# Patient Record
Sex: FEMALE | Race: WHITE | NOT HISPANIC OR LATINO | Employment: OTHER | URBAN - METROPOLITAN AREA
[De-identification: names, ages, dates, MRNs, and addresses within clinical notes are randomized per-mention and may not be internally consistent; named-entity substitution may affect disease eponyms.]

---

## 2018-04-03 ENCOUNTER — OFFICE VISIT CONVERTED (OUTPATIENT)
Dept: CARDIOLOGY | Facility: CLINIC | Age: 63
End: 2018-04-03
Attending: SPECIALIST

## 2018-11-01 ENCOUNTER — OFFICE VISIT CONVERTED (OUTPATIENT)
Dept: NEUROSURGERY | Facility: CLINIC | Age: 63
End: 2018-11-01
Attending: NEUROLOGICAL SURGERY

## 2019-02-11 ENCOUNTER — HOSPITAL ENCOUNTER (OUTPATIENT)
Dept: OTHER | Facility: HOSPITAL | Age: 64
Discharge: HOME | End: 2019-02-11
Attending: SPECIALIST

## 2019-02-11 LAB
ALBUMIN SERPL-MCNC: 4.1 G/DL (ref 3.5–5)
ALBUMIN/GLOB SERPL: 1.5 {RATIO} (ref 1.4–2.6)
ALP SERPL-CCNC: 94 U/L (ref 43–160)
ALT SERPL-CCNC: 27 U/L (ref 10–40)
ANION GAP SERPL CALC-SCNC: 18 MMOL/L (ref 8–19)
AST SERPL-CCNC: 17 U/L (ref 15–50)
BILIRUB SERPL-MCNC: 0.74 MG/DL (ref 0.2–1.3)
BUN SERPL-MCNC: 19 MG/DL (ref 5–25)
BUN/CREAT SERPL: 17 {RATIO} (ref 6–20)
CALCIUM SERPL-MCNC: 9.7 MG/DL (ref 8.7–10.4)
CHLORIDE SERPL-SCNC: 93 MMOL/L (ref 99–111)
CHOLEST SERPL-MCNC: 231 MG/DL (ref 107–200)
CHOLEST/HDLC SERPL: 5.9 {RATIO} (ref 3–6)
CONV CO2: 29 MMOL/L (ref 22–32)
CONV TOTAL PROTEIN: 6.9 G/DL (ref 6.3–8.2)
CREAT UR-MCNC: 1.13 MG/DL (ref 0.5–0.9)
GFR SERPLBLD BASED ON 1.73 SQ M-ARVRAT: 51 ML/MIN/{1.73_M2}
GLOBULIN UR ELPH-MCNC: 2.8 G/DL (ref 2–3.5)
GLUCOSE SERPL-MCNC: 370 MG/DL (ref 65–99)
HDLC SERPL-MCNC: 39 MG/DL (ref 40–60)
LDLC SERPL CALC-MCNC: 148 MG/DL (ref 70–100)
OSMOLALITY SERPL CALC.SUM OF ELEC: 299 MOSM/KG (ref 273–304)
POTASSIUM SERPL-SCNC: 4.4 MMOL/L (ref 3.5–5.3)
SODIUM SERPL-SCNC: 136 MMOL/L (ref 135–147)
TRIGL SERPL-MCNC: 463 MG/DL (ref 40–150)

## 2019-03-08 ENCOUNTER — OFFICE VISIT CONVERTED (OUTPATIENT)
Dept: CARDIOLOGY | Facility: CLINIC | Age: 64
End: 2019-03-08
Attending: SPECIALIST

## 2019-06-20 ENCOUNTER — HOSPITAL ENCOUNTER (OUTPATIENT)
Dept: OTHER | Facility: HOSPITAL | Age: 64
Discharge: HOME | End: 2019-06-20
Attending: SPECIALIST

## 2019-06-20 LAB
ALBUMIN SERPL-MCNC: 4.2 G/DL (ref 3.5–5)
ALP SERPL-CCNC: 88 U/L (ref 43–160)
ALT SERPL-CCNC: 14 U/L (ref 10–40)
AST SERPL-CCNC: 15 U/L (ref 15–50)
BILIRUB SERPL-MCNC: 0.81 MG/DL (ref 0.2–1.3)
CHOLEST SERPL-MCNC: 163 MG/DL (ref 107–200)
CHOLEST/HDLC SERPL: 5.4 {RATIO} (ref 3–6)
CONV BILI, CONJUGATED: <0.2 MG/DL (ref 0–0.6)
CONV TOTAL PROTEIN: 7.1 G/DL (ref 6.3–8.2)
CONV UNCONJUGATED BILIRUBIN: 0.6 MG/DL (ref 0–1.1)
HDLC SERPL-MCNC: 30 MG/DL (ref 40–60)
LDLC SERPL CALC-MCNC: 79 MG/DL (ref 70–100)
TRIGL SERPL-MCNC: 271 MG/DL (ref 40–150)
VLDLC SERPL-MCNC: 54 MG/DL (ref 5–37)

## 2019-06-21 ENCOUNTER — OFFICE VISIT CONVERTED (OUTPATIENT)
Dept: CARDIOLOGY | Facility: CLINIC | Age: 64
End: 2019-06-21
Attending: SPECIALIST

## 2019-08-15 ENCOUNTER — HOSPITAL ENCOUNTER (OUTPATIENT)
Dept: CARDIOLOGY | Facility: HOSPITAL | Age: 64
Discharge: HOME | End: 2019-08-15
Attending: NEUROLOGICAL SURGERY

## 2019-09-24 ENCOUNTER — HOSPITAL ENCOUNTER (OUTPATIENT)
Dept: OTHER | Facility: HOSPITAL | Age: 64
Discharge: HOME | End: 2019-09-24

## 2019-09-24 LAB
BUN SERPL-MCNC: 16 MG/DL (ref 5–25)
CREAT UR-MCNC: 1.12 MG/DL (ref 0.5–0.9)

## 2019-09-26 ENCOUNTER — HOSPITAL ENCOUNTER (OUTPATIENT)
Dept: CT IMAGING | Facility: HOSPITAL | Age: 64
Discharge: HOME | End: 2019-09-26

## 2019-12-03 ENCOUNTER — OFFICE VISIT CONVERTED (OUTPATIENT)
Dept: CARDIOLOGY | Facility: CLINIC | Age: 64
End: 2019-12-03
Attending: SPECIALIST

## 2020-06-09 ENCOUNTER — HOSPITAL ENCOUNTER (OUTPATIENT)
Dept: OTHER | Facility: HOSPITAL | Age: 65
Discharge: HOME | End: 2020-06-09
Attending: SPECIALIST

## 2020-06-09 ENCOUNTER — OFFICE VISIT CONVERTED (OUTPATIENT)
Dept: CARDIOLOGY | Facility: CLINIC | Age: 65
End: 2020-06-09
Attending: SPECIALIST

## 2020-06-09 ENCOUNTER — CONVERSION ENCOUNTER (OUTPATIENT)
Dept: OTHER | Facility: HOSPITAL | Age: 65
End: 2020-06-09

## 2020-06-09 LAB
ANION GAP SERPL CALC-SCNC: 13 MMOL/L (ref 8–19)
BASOPHILS # BLD AUTO: 0.06 10*3/UL (ref 0–0.2)
BASOPHILS NFR BLD AUTO: 0.6 % (ref 0–3)
BUN SERPL-MCNC: 13 MG/DL (ref 5–25)
BUN/CREAT SERPL: 12 {RATIO} (ref 6–20)
CALCIUM SERPL-MCNC: 9.7 MG/DL (ref 8.7–10.4)
CHLORIDE SERPL-SCNC: 94 MMOL/L (ref 99–111)
CONV ABS IMM GRAN: 0.04 10*3/UL (ref 0–0.2)
CONV CO2: 31 MMOL/L (ref 22–32)
CONV IMMATURE GRAN: 0.4 % (ref 0–1.8)
CREAT UR-MCNC: 1.07 MG/DL (ref 0.5–0.9)
DEPRECATED RDW RBC AUTO: 44.3 FL (ref 36.4–46.3)
EOSINOPHIL # BLD AUTO: 0.17 10*3/UL (ref 0–0.7)
EOSINOPHIL # BLD AUTO: 1.6 % (ref 0–7)
ERYTHROCYTE [DISTWIDTH] IN BLOOD BY AUTOMATED COUNT: 13 % (ref 11.7–14.4)
GFR SERPLBLD BASED ON 1.73 SQ M-ARVRAT: 54 ML/MIN/{1.73_M2}
GLUCOSE SERPL-MCNC: 200 MG/DL (ref 65–99)
HCT VFR BLD AUTO: 38 % (ref 37–47)
HGB BLD-MCNC: 12.7 G/DL (ref 12–16)
LYMPHOCYTES # BLD AUTO: 2.27 10*3/UL (ref 1–5)
LYMPHOCYTES NFR BLD AUTO: 21.1 % (ref 20–45)
MCH RBC QN AUTO: 31.4 PG (ref 27–31)
MCHC RBC AUTO-ENTMCNC: 33.4 G/DL (ref 33–37)
MCV RBC AUTO: 93.8 FL (ref 81–99)
MONOCYTES # BLD AUTO: 0.59 10*3/UL (ref 0.2–1.2)
MONOCYTES NFR BLD AUTO: 5.5 % (ref 3–10)
NEUTROPHILS # BLD AUTO: 7.63 10*3/UL (ref 2–8)
NEUTROPHILS NFR BLD AUTO: 70.8 % (ref 30–85)
NRBC CBCN: 0 % (ref 0–0.7)
OSMOLALITY SERPL CALC.SUM OF ELEC: 284 MOSM/KG (ref 273–304)
PLATELET # BLD AUTO: 210 10*3/UL (ref 130–400)
PMV BLD AUTO: 10 FL (ref 9.4–12.3)
POTASSIUM SERPL-SCNC: 4.1 MMOL/L (ref 3.5–5.3)
RBC # BLD AUTO: 4.05 10*6/UL (ref 4.2–5.4)
SODIUM SERPL-SCNC: 134 MMOL/L (ref 135–147)
WBC # BLD AUTO: 10.76 10*3/UL (ref 4.8–10.8)

## 2020-09-08 ENCOUNTER — OFFICE VISIT CONVERTED (OUTPATIENT)
Dept: ORTHOPEDIC SURGERY | Facility: CLINIC | Age: 65
End: 2020-09-08
Attending: ORTHOPAEDIC SURGERY

## 2020-10-09 ENCOUNTER — OFFICE VISIT CONVERTED (OUTPATIENT)
Dept: CARDIOLOGY | Facility: CLINIC | Age: 65
End: 2020-10-09
Attending: SPECIALIST

## 2021-01-01 ENCOUNTER — TELEPHONE (OUTPATIENT)
Dept: CARDIOLOGY | Facility: CLINIC | Age: 66
End: 2021-01-01

## 2021-01-01 ENCOUNTER — OFFICE VISIT (OUTPATIENT)
Dept: ORTHOPEDIC SURGERY | Facility: CLINIC | Age: 66
End: 2021-01-01

## 2021-01-01 VITALS — BODY MASS INDEX: 37.73 KG/M2 | HEART RATE: 74 BPM | OXYGEN SATURATION: 96 % | WEIGHT: 221 LBS | HEIGHT: 64 IN

## 2021-01-01 VITALS
WEIGHT: 204 LBS | DIASTOLIC BLOOD PRESSURE: 98 MMHG | BODY MASS INDEX: 34.83 KG/M2 | HEART RATE: 60 BPM | SYSTOLIC BLOOD PRESSURE: 150 MMHG | HEIGHT: 64 IN

## 2021-01-01 VITALS
SYSTOLIC BLOOD PRESSURE: 136 MMHG | DIASTOLIC BLOOD PRESSURE: 76 MMHG | WEIGHT: 221 LBS | HEART RATE: 80 BPM | BODY MASS INDEX: 37.73 KG/M2 | HEIGHT: 64 IN

## 2021-01-01 VITALS
HEIGHT: 65 IN | WEIGHT: 215 LBS | BODY MASS INDEX: 35.82 KG/M2 | SYSTOLIC BLOOD PRESSURE: 132 MMHG | DIASTOLIC BLOOD PRESSURE: 90 MMHG | HEART RATE: 68 BPM

## 2021-01-01 VITALS
HEART RATE: 74 BPM | SYSTOLIC BLOOD PRESSURE: 140 MMHG | HEIGHT: 64 IN | DIASTOLIC BLOOD PRESSURE: 78 MMHG | WEIGHT: 217 LBS | BODY MASS INDEX: 37.05 KG/M2

## 2021-01-01 VITALS
WEIGHT: 213 LBS | BODY MASS INDEX: 35.49 KG/M2 | DIASTOLIC BLOOD PRESSURE: 108 MMHG | HEIGHT: 65 IN | SYSTOLIC BLOOD PRESSURE: 166 MMHG

## 2021-01-01 VITALS
HEIGHT: 65 IN | SYSTOLIC BLOOD PRESSURE: 168 MMHG | DIASTOLIC BLOOD PRESSURE: 116 MMHG | BODY MASS INDEX: 37.32 KG/M2 | WEIGHT: 224 LBS | HEART RATE: 68 BPM

## 2021-01-01 VITALS
HEART RATE: 116 BPM | BODY MASS INDEX: 34.15 KG/M2 | SYSTOLIC BLOOD PRESSURE: 148 MMHG | HEIGHT: 64 IN | DIASTOLIC BLOOD PRESSURE: 78 MMHG | WEIGHT: 200 LBS

## 2021-01-01 VITALS — HEIGHT: 64 IN | WEIGHT: 210.2 LBS | HEART RATE: 95 BPM | BODY MASS INDEX: 35.89 KG/M2 | OXYGEN SATURATION: 96 %

## 2021-01-01 VITALS
WEIGHT: 232 LBS | DIASTOLIC BLOOD PRESSURE: 108 MMHG | BODY MASS INDEX: 39.61 KG/M2 | HEART RATE: 74 BPM | SYSTOLIC BLOOD PRESSURE: 182 MMHG | HEIGHT: 64 IN

## 2021-01-01 VITALS
WEIGHT: 235 LBS | SYSTOLIC BLOOD PRESSURE: 151 MMHG | HEIGHT: 64 IN | DIASTOLIC BLOOD PRESSURE: 55 MMHG | BODY MASS INDEX: 40.12 KG/M2

## 2021-01-01 DIAGNOSIS — Z96.641 HISTORY OF TOTAL RIGHT HIP REPLACEMENT: ICD-10-CM

## 2021-01-01 DIAGNOSIS — M25.551 RIGHT HIP PAIN: Primary | ICD-10-CM

## 2021-01-01 PROCEDURE — 99213 OFFICE O/P EST LOW 20 MIN: CPT | Performed by: ORTHOPAEDIC SURGERY

## 2021-01-01 RX ORDER — LOSARTAN POTASSIUM 100 MG/1
100 TABLET ORAL DAILY
Status: ON HOLD | COMMUNITY
Start: 2021-01-01 | End: 2022-01-01 | Stop reason: SDUPTHER

## 2021-01-01 RX ORDER — LISINOPRIL 5 MG/1
TABLET ORAL
COMMUNITY
End: 2022-01-01 | Stop reason: HOSPADM

## 2021-01-01 RX ORDER — ALPRAZOLAM 1 MG/1
1 TABLET ORAL 4 TIMES DAILY PRN
COMMUNITY
Start: 2021-01-01

## 2021-01-01 RX ORDER — SEMAGLUTIDE 1.34 MG/ML
0.5 INJECTION, SOLUTION SUBCUTANEOUS WEEKLY
COMMUNITY
Start: 2021-01-01

## 2021-01-01 RX ORDER — CITALOPRAM 10 MG/1
10 TABLET ORAL DAILY
COMMUNITY

## 2021-01-01 RX ORDER — ATORVASTATIN CALCIUM 40 MG/1
40 TABLET, FILM COATED ORAL DAILY
Status: ON HOLD | COMMUNITY
Start: 2021-04-12 | End: 2022-01-01 | Stop reason: SDUPTHER

## 2021-01-01 RX ORDER — CARVEDILOL 12.5 MG/1
TABLET ORAL
COMMUNITY
Start: 2021-01-01 | End: 2022-01-01 | Stop reason: HOSPADM

## 2021-01-01 RX ORDER — EZETIMIBE 10 MG/1
TABLET ORAL
Qty: 30 TABLET | Refills: 8 | Status: SHIPPED | OUTPATIENT
Start: 2021-01-01

## 2021-01-01 RX ORDER — INSULIN DETEMIR 100 [IU]/ML
25 INJECTION, SOLUTION SUBCUTANEOUS 2 TIMES DAILY
COMMUNITY
Start: 2021-01-01

## 2021-01-01 RX ORDER — ASPIRIN 81 MG/1
81 TABLET, CHEWABLE ORAL DAILY
Status: ON HOLD | COMMUNITY
Start: 2021-04-02 | End: 2022-01-01 | Stop reason: SDUPTHER

## 2021-01-01 RX ORDER — TICAGRELOR 90 MG/1
90 TABLET ORAL 2 TIMES DAILY
Status: ON HOLD | COMMUNITY
Start: 2021-01-01 | End: 2022-01-01 | Stop reason: SDUPTHER

## 2021-01-01 RX ORDER — TIZANIDINE HYDROCHLORIDE 4 MG/1
4 CAPSULE, GELATIN COATED ORAL 4 TIMES DAILY PRN
COMMUNITY
Start: 2021-04-08

## 2021-02-25 ENCOUNTER — HOSPITAL ENCOUNTER (OUTPATIENT)
Dept: OTHER | Facility: HOSPITAL | Age: 66
Discharge: HOME | End: 2021-02-25
Attending: SPECIALIST

## 2021-02-25 LAB
ALBUMIN SERPL-MCNC: 3.9 G/DL (ref 3.5–5)
ALP SERPL-CCNC: 90 U/L (ref 43–160)
ALT SERPL-CCNC: 15 U/L (ref 10–40)
ANION GAP SERPL CALC-SCNC: 13 MMOL/L (ref 8–19)
AST SERPL-CCNC: 16 U/L (ref 15–50)
BASOPHILS # BLD AUTO: 0.08 10*3/UL (ref 0–0.2)
BASOPHILS NFR BLD AUTO: 0.6 % (ref 0–3)
BILIRUB SERPL-MCNC: 0.22 MG/DL (ref 0.2–1.3)
BUN SERPL-MCNC: 19 MG/DL (ref 5–25)
BUN/CREAT SERPL: 20 {RATIO} (ref 6–20)
CALCIUM SERPL-MCNC: 9.5 MG/DL (ref 8.7–10.4)
CHLORIDE SERPL-SCNC: 101 MMOL/L (ref 99–111)
CHOLEST SERPL-MCNC: 212 MG/DL (ref 107–200)
CHOLEST/HDLC SERPL: 5.3 {RATIO} (ref 3–6)
CONV ABS IMM GRAN: 0.08 10*3/UL (ref 0–0.2)
CONV BILI, CONJUGATED: <0.2 MG/DL (ref 0–0.6)
CONV CO2: 31 MMOL/L (ref 22–32)
CONV IMMATURE GRAN: 0.6 % (ref 0–1.8)
CONV TOTAL PROTEIN: 7.3 G/DL (ref 6.3–8.2)
CONV UNCONJUGATED BILIRUBIN: 0 MG/DL (ref 0–1.1)
CREAT UR-MCNC: 0.97 MG/DL (ref 0.5–0.9)
DEPRECATED RDW RBC AUTO: 44.7 FL (ref 36.4–46.3)
EOSINOPHIL # BLD AUTO: 0.16 10*3/UL (ref 0–0.7)
EOSINOPHIL # BLD AUTO: 1.3 % (ref 0–7)
ERYTHROCYTE [DISTWIDTH] IN BLOOD BY AUTOMATED COUNT: 13.3 % (ref 11.7–14.4)
GFR SERPLBLD BASED ON 1.73 SQ M-ARVRAT: >60 ML/MIN/{1.73_M2}
GLUCOSE SERPL-MCNC: 134 MG/DL (ref 65–99)
HCT VFR BLD AUTO: 41.1 % (ref 37–47)
HDLC SERPL-MCNC: 40 MG/DL (ref 40–60)
HGB BLD-MCNC: 13.5 G/DL (ref 12–16)
LDLC SERPL CALC-MCNC: 139 MG/DL (ref 70–100)
LYMPHOCYTES # BLD AUTO: 2.2 10*3/UL (ref 1–5)
LYMPHOCYTES NFR BLD AUTO: 17.7 % (ref 20–45)
MCH RBC QN AUTO: 30.3 PG (ref 27–31)
MCHC RBC AUTO-ENTMCNC: 32.8 G/DL (ref 33–37)
MCV RBC AUTO: 92.2 FL (ref 81–99)
MONOCYTES # BLD AUTO: 0.66 10*3/UL (ref 0.2–1.2)
MONOCYTES NFR BLD AUTO: 5.3 % (ref 3–10)
NEUTROPHILS # BLD AUTO: 9.27 10*3/UL (ref 2–8)
NEUTROPHILS NFR BLD AUTO: 74.5 % (ref 30–85)
NRBC CBCN: 0 % (ref 0–0.7)
OSMOLALITY SERPL CALC.SUM OF ELEC: 296 MOSM/KG (ref 273–304)
PLATELET # BLD AUTO: 378 10*3/UL (ref 130–400)
PMV BLD AUTO: 9 FL (ref 9.4–12.3)
POTASSIUM SERPL-SCNC: 3.9 MMOL/L (ref 3.5–5.3)
RBC # BLD AUTO: 4.46 10*6/UL (ref 4.2–5.4)
SODIUM SERPL-SCNC: 141 MMOL/L (ref 135–147)
TRIGL SERPL-MCNC: 166 MG/DL (ref 40–150)
VLDLC SERPL-MCNC: 33 MG/DL (ref 5–37)
WBC # BLD AUTO: 12.45 10*3/UL (ref 4.8–10.8)

## 2021-02-26 ENCOUNTER — OFFICE VISIT CONVERTED (OUTPATIENT)
Dept: CARDIOLOGY | Facility: CLINIC | Age: 66
End: 2021-02-26
Attending: SPECIALIST

## 2021-02-26 ENCOUNTER — CONVERSION ENCOUNTER (OUTPATIENT)
Dept: CARDIOLOGY | Facility: CLINIC | Age: 66
End: 2021-02-26

## 2021-03-03 ENCOUNTER — HOSPITAL ENCOUNTER (OUTPATIENT)
Dept: PREADMISSION TESTING | Facility: HOSPITAL | Age: 66
Discharge: HOME | End: 2021-03-03
Attending: INTERNAL MEDICINE

## 2021-03-04 LAB — SARS-COV-2 RNA SPEC QL NAA+PROBE: NOT DETECTED

## 2021-03-08 ENCOUNTER — HOSPITAL ENCOUNTER (OUTPATIENT)
Dept: INFUSION THERAPY | Facility: HOSPITAL | Age: 66
Setting detail: HOSPITAL OUTPATIENT SURGERY
Discharge: HOME | End: 2021-03-09
Attending: INTERNAL MEDICINE

## 2021-03-08 LAB
ANION GAP SERPL CALC-SCNC: 14 MMOL/L (ref 8–19)
APTT BLD: 21.9 S (ref 22.2–34.2)
BASOPHILS # BLD AUTO: 0.09 10*3/UL (ref 0–0.2)
BASOPHILS NFR BLD AUTO: 0.7 % (ref 0–3)
BUN SERPL-MCNC: 22 MG/DL (ref 5–25)
BUN/CREAT SERPL: 18 {RATIO} (ref 6–20)
CALCIUM SERPL-MCNC: 9.9 MG/DL (ref 8.7–10.4)
CHLORIDE SERPL-SCNC: 99 MMOL/L (ref 99–111)
CONV ABS IMM GRAN: 0.08 10*3/UL (ref 0–0.2)
CONV ACT HIGH RANGE CLOTTIME: NORMAL
CONV CO2: 28 MMOL/L (ref 22–32)
CONV IMMATURE GRAN: 0.6 % (ref 0–1.8)
CREAT UR-MCNC: 1.21 MG/DL (ref 0.5–0.9)
DEPRECATED RDW RBC AUTO: 46.1 FL (ref 36.4–46.3)
EOSINOPHIL # BLD AUTO: 0.19 10*3/UL (ref 0–0.7)
EOSINOPHIL # BLD AUTO: 1.5 % (ref 0–7)
ERYTHROCYTE [DISTWIDTH] IN BLOOD BY AUTOMATED COUNT: 13.6 % (ref 11.7–14.4)
GFR SERPLBLD BASED ON 1.73 SQ M-ARVRAT: 47 ML/MIN/{1.73_M2}
GLUCOSE SERPL-MCNC: 120 MG/DL (ref 65–99)
HCT VFR BLD AUTO: 40.4 % (ref 37–47)
HGB BLD-MCNC: 13.1 G/DL (ref 12–16)
INR PPP: 0.89 (ref 2–3)
LYMPHOCYTES # BLD AUTO: 1.91 10*3/UL (ref 1–5)
LYMPHOCYTES NFR BLD AUTO: 14.6 % (ref 20–45)
MCH RBC QN AUTO: 29.8 PG (ref 27–31)
MCHC RBC AUTO-ENTMCNC: 32.4 G/DL (ref 33–37)
MCV RBC AUTO: 92 FL (ref 81–99)
MONOCYTES # BLD AUTO: 0.72 10*3/UL (ref 0.2–1.2)
MONOCYTES NFR BLD AUTO: 5.5 % (ref 3–10)
NEUTROPHILS # BLD AUTO: 10.06 10*3/UL (ref 2–8)
NEUTROPHILS NFR BLD AUTO: 77.1 % (ref 30–85)
NRBC CBCN: 0 % (ref 0–0.7)
OSMOLALITY SERPL CALC.SUM OF ELEC: 289 MOSM/KG (ref 273–304)
PLATELET # BLD AUTO: 283 10*3/UL (ref 130–400)
PMV BLD AUTO: 9.4 FL (ref 9.4–12.3)
POTASSIUM SERPL-SCNC: 4.1 MMOL/L (ref 3.5–5.3)
PROTHROMBIN TIME: 10 S (ref 9.4–12)
RBC # BLD AUTO: 4.39 10*6/UL (ref 4.2–5.4)
SODIUM SERPL-SCNC: 137 MMOL/L (ref 135–147)
WBC # BLD AUTO: 13.05 10*3/UL (ref 4.8–10.8)

## 2021-03-09 LAB
ANION GAP SERPL CALC-SCNC: 13 MMOL/L (ref 8–19)
BASOPHILS # BLD AUTO: 0.05 10*3/UL (ref 0–0.2)
BASOPHILS NFR BLD AUTO: 0.4 % (ref 0–3)
BUN SERPL-MCNC: 23 MG/DL (ref 5–25)
BUN/CREAT SERPL: 21 {RATIO} (ref 6–20)
CALCIUM SERPL-MCNC: 9 MG/DL (ref 8.7–10.4)
CHLORIDE SERPL-SCNC: 105 MMOL/L (ref 99–111)
CONV ABS IMM GRAN: 0.05 10*3/UL (ref 0–0.2)
CONV CO2: 23 MMOL/L (ref 22–32)
CONV IMMATURE GRAN: 0.4 % (ref 0–1.8)
CREAT UR-MCNC: 1.12 MG/DL (ref 0.5–0.9)
DEPRECATED RDW RBC AUTO: 46 FL (ref 36.4–46.3)
EOSINOPHIL # BLD AUTO: 0.19 10*3/UL (ref 0–0.7)
EOSINOPHIL # BLD AUTO: 1.5 % (ref 0–7)
ERYTHROCYTE [DISTWIDTH] IN BLOOD BY AUTOMATED COUNT: 13.4 % (ref 11.7–14.4)
GFR SERPLBLD BASED ON 1.73 SQ M-ARVRAT: 51 ML/MIN/{1.73_M2}
GLUCOSE SERPL-MCNC: 209 MG/DL (ref 65–99)
HCT VFR BLD AUTO: 31.1 % (ref 37–47)
HGB BLD-MCNC: 10.1 G/DL (ref 12–16)
LYMPHOCYTES # BLD AUTO: 1.2 10*3/UL (ref 1–5)
LYMPHOCYTES NFR BLD AUTO: 9.5 % (ref 20–45)
MCH RBC QN AUTO: 30.2 PG (ref 27–31)
MCHC RBC AUTO-ENTMCNC: 32.5 G/DL (ref 33–37)
MCV RBC AUTO: 93.1 FL (ref 81–99)
MONOCYTES # BLD AUTO: 0.83 10*3/UL (ref 0.2–1.2)
MONOCYTES NFR BLD AUTO: 6.5 % (ref 3–10)
NEUTROPHILS # BLD AUTO: 10.37 10*3/UL (ref 2–8)
NEUTROPHILS NFR BLD AUTO: 81.7 % (ref 30–85)
NRBC CBCN: 0 % (ref 0–0.7)
OSMOLALITY SERPL CALC.SUM OF ELEC: 294 MOSM/KG (ref 273–304)
PLATELET # BLD AUTO: 188 10*3/UL (ref 130–400)
PMV BLD AUTO: 9.9 FL (ref 9.4–12.3)
POTASSIUM SERPL-SCNC: 4.1 MMOL/L (ref 3.5–5.3)
RBC # BLD AUTO: 3.34 10*6/UL (ref 4.2–5.4)
SODIUM SERPL-SCNC: 137 MMOL/L (ref 135–147)
WBC # BLD AUTO: 12.69 10*3/UL (ref 4.8–10.8)

## 2021-04-06 ENCOUNTER — CONVERSION ENCOUNTER (OUTPATIENT)
Dept: CARDIOLOGY | Facility: CLINIC | Age: 66
End: 2021-04-06

## 2021-04-06 ENCOUNTER — OFFICE VISIT CONVERTED (OUTPATIENT)
Dept: CARDIOLOGY | Facility: CLINIC | Age: 66
End: 2021-04-06
Attending: SPECIALIST

## 2021-05-10 NOTE — H&P
History and Physical      Patient Name: Betty Sprague   Patient ID: [id number]   Sex: Female   YOB: 1955    Primary Care Provider: Maico Young MD   Referring Provider: Maico Young MD    Visit Date: September 8, 2020    Provider: Amanda Figueroa MD   Location: [facility]   Location Address: [address] [place] [address]  [id number]   Location Phone: [phone]          Chief Complaint  · Right hip replacement      History Of Present Illness  Betty Sprague is a 65 year old /White female who presents today to [facility].      Patient presents today with a chief complaint of right hip pain. Patient points to her lower back, to her hip and down her leg. Patient states that she has been experiencing immense pain since her last visit. Patient states that it feels like a golf ball in her back and then the stinging, burning sensation down her leg. Patient has been experiencing cramps in her leg as well.                  Past Medical History  Arthritis; Cervicalgia; Chronic Obstructive Pulmonary Disease; Congestive heart failure; Degenerative Disc Disease ; Dental decay; Diabetes; Essential hypertension; Heart attack; Herniated Disc; Hip fracture; Hip fracture, right, closed, initial encounter; Hyperlipemia; Hyperlipidemia; Hypertension; Hypertension, Benign Essential; Leg pain; Leg swelling; Leg weakness, bilateral; Lumbago/low back pain; Lumbar back pain; Mild episode of recurrent major depressive disorder; Muscle cramps; Seasonal allergies; Spinal stenosis of lumbar region; Spinal stenosis, lumbar region; Tobacco abuse; Tobacco abuse counseling; Vascular disease, peripheral         Past Surgical History  cardiac stents; Cesarian Section; heart surgery; I have had no surgeries; Tonsillectomy         Medication List  alprazolam 0.5 mg oral tablet; atenolol 50 mg oral tablet; atorvastatin 40 mg oral tablet; Brilinta 90 mg oral tablet; clopidogrel 75 mg oral tablet;  "Dakin's Solution 0.25 % miscellaneous solution; lisinopril 5 mg oral tablet; Norco 7.5-325 mg oral tablet         Allergy List  NO KNOWN DRUG ALLERGIES; Latex       Allergies Reconciled  Family Medical History  Heart Disease; Cancer, Unspecified; Family history of Arthritis; Family history of heart disease         Social History  Alcohol (Never); Alcohol Use (Current some day); ; lives alone; lives with other; Recreational Drug Use (Never); Retired; Retired.; Tobacco (Current every day);          Review of Systems  · Constitutional  o Denies  o : fever, chills, weight loss  · Cardiovascular  o Denies  o : chest pain, shortness of breath  · Gastrointestinal  o Denies  o : liver disease, heartburn, nausea, blood in stools  · Genitourinary  o Denies  o : painful urination, blood in urine  · Integument  o Denies  o : rash, itching  · Neurologic  o Denies  o : headache, weakness, loss of consciousness  · Musculoskeletal  o Denies  o : painful, swollen joints  · Psychiatric  o Denies  o : drug/alcohol addiction, anxiety, depression      Vitals  Date Time BP Position Site L\R Cuff Size HR RR TEMP (F) WT  HT  BMI kg/m2 BSA m2 O2 Sat        09/08/2020 03:15 PM      74 - R   221lbs 0oz 5'  4\" 37.93 2.13 96 %          Physical Examination  · Constitutional  o Appearance  o : well developed, well-nourished, no obvious deformities present  · Head and Face  o Head  o :   § Inspection  § : normocephalic  o Face  o :   § Inspection  § : no facial lesions  · Eyes  o Conjunctivae  o : conjunctivae normal  o Sclerae  o : sclerae white  · Ears, Nose, Mouth and Throat  o Ears  o :   § External Ears  § : appearance within normal limits  § Hearing  § : intact  o Nose  o :   § External Nose  § : appearance normal  · Neck  o Inspection/Palpation  o : normal appearance  o Range of Motion  o : full range of motion  · Respiratory  o Respiratory Effort  o : breathing unlabored  o Inspection of Chest  o : normal " appearance  o Auscultation of Lungs  o : no audible wheezing or rales  · Cardiovascular  o Heart  o : regular rate  · Gastrointestinal  o Abdominal Examination  o : soft and non-tender  · Skin and Subcutaneous Tissue  o General Inspection  o : intact, no rashes  · Psychiatric  o General  o : Alert and oriented x3  o Judgement and Insight  o : judgment and insight intact  o Mood and Affect  o : mood normal, affect appropriate  · Right Hip  o Inspection  o : Pain with flexion and extension. Good strength in quadriceps, hamstrings, dorsiflexors, and plantar flexors. calf; supple: no signs of DVT. ROM with some mild pain. Non-tender to palpation. No swelling, skin discoloration or atrophy.   · In Office Procedures  o View  o : AP/LATERAL  o Site  o : right, hip   o Indication  o : Right hip pain   o Study  o : X-rays ordered, taken in the office, and reviewed today.  o Xray  o : no acute radiographic abnormality.          Assessment  · Right Pain: Hip     719.45/M25.559  · Sciatic pain     724.3/M54.30      Plan  · Orders  o Hip (Right) 2 or more views (includes AP Pelvis) [facility] Preferred View. (32561) - 719.45/M25.559 - 09/08/2020  · Medications  o Medications have been Reconciled  o Transition of Care or Provider Policy  · Instructions  o Dr. Figueroa saw and examined the patient and agrees with plan.   o X-rays reviewed by Dr. Figueroa.  o Reviewed the patient's Past Medical, Social, and Family history as well as the ROS at today's visit, no changes.  o Call or return if worsening symptoms.  o Follow up after MRI.  o This note was transcribed by Jaylin Obrien.   o Discussed diagnosis and treatment options with the patient. Patient opted to get a MRI and will follow-up after getting results.            Electronically Signed by: Jaylin Obrien-, Other -Author on September 10, 2020 08:53:30 AM  Electronically Co-signed by: Amanda Figueroa MD -Reviewer on September 11, 2020 05:29:00 PM

## 2021-05-13 NOTE — PROGRESS NOTES
Progress Note      Patient Name: Betty Sprague   Patient ID: [id number]   Sex: Female   YOB: 1955    Primary Care Provider: Maico Young MD   Referring Provider: Maico Young MD    Visit Date: October 9, 2020    Provider: Andrew Mendoza MD   Location: [facility]   Location Address: [address] [place] [address], [address]   [place], [place]  [id number]   Location Phone: [phone]          Chief Complaint  · Right leg pain      History Of Present Illness  REFERRING CARE PROVIDER: Maico Young MD   Betty Sprague is a very pleasant 65-year-old white female who is followed by Dr. Mendoza chronically and has a history of coronary artery disease with previous PCI. She was also recently diagnosed with Takotsubo cardiomyopathy, but states she is feeling better. The patient is also known to have right lower extremity occlusive peripheral arterial disease. Her recent CTA of the abdomen with lower extremity run-off showed a chronically occluded right common iliac artery at its ostium with reconstitution to collaterals slightly proximal to the external/internal iliac artery bifurcation. She was not observed to have any hemodynamically significant peripheral disease in the distal right lower extremity. She continues to have stable limiting Liliane class 3 right lower extremity claudication, and states she can only walk approximate 40 to 50 yards before having to rest for 10 to 15 minutes. She states this is significantly inhibiting her lifestyle. No rest pain reported, and she does not have any right lower extremity wound/ulcers. Likewise, no chest pain, palpitations, orthopnea, PND, lightheadedness, or syncope reported today. She is on dual antiplatelet therapy with aspirin and Brilinta, and does not report any bleeding problems. She would like to proceed with endovascular intervention to her right lower extremity disease if possible.   PAST MEDICAL HISTORY: Coronary artery disease with  "non-ST-myocardial infarction with PTCA/stent; essential hypertension; hyperlipidemia.   PSYCHOSOCIAL HISTORY: Denies alcohol use. Currently smokes.   CURRENT MEDICATIONS: include Metformin 1000 mg b.i.d.; Losartan 25 mg daily; Brilinta 90 mg b.i.d.; aspirin daily; Carvedilol 3.125 mg b.i.d.; Hydrocodone 10 mg p.r.n. The dosage and frequency of the medications were reviewed with the patient.       Review of Systems  · Cardiovascular  o Admits  o : palpitations (fast, fluttering, or skipping beats), swelling (feet, ankles, hands), shortness of breath while walking or lying flat  o Denies  o : chest pain or angina pectoris   · Respiratory  o Denies  o : chronic or frequent cough      Vitals  Date Time BP Position Site L\R Cuff Size HR RR TEMP (F) WT  HT  BMI kg/m2 BSA m2 O2 Sat FR L/min FiO2 HC       10/09/2020 09:45 /108 Sitting       212lbs 16oz 5'  5\" 35.44 2.1       10/09/2020 09:45 /108 Sitting                       Physical Examination  · Respiratory  o Auscultation of Lungs  o : Clear to auscultation bilaterally. Prolonged expiratory phase. No wheezing or rales. A few scattered rhonchi present. No tachypnea, normal effort. No dullness to percussion.   · Cardiovascular  o Heart  o : Regular rate and rhythm. Normal S1 and S2. No S3 or S4 gallop. No murmur or friction rub. PMI not displaced.   · Gastrointestinal  o Abdominal Examination  o : Soft, obese, nondistended, nontender. Normal bowel sounds throughout all quadrants. No masses.   · Extremities  o Extremities  o : No cyanosis or clubbing. Trace to 1+ bilateral lower extremity edema, which is chronic. 2+ radial pulse in the right upper extremity with a 1+ radial pulse in the left upper extremity. Diminished PT pulse in the right lower extremity with normal right PT pulse.          Assessment     1.  Peripheral arterial occlusive disease with known chronic total occlusion of the right common iliac artery at        its ostium and reconstitution " through collaterals slightly proximal to the right external/internal iliac artery        bifurcation.  The patient has stable but very limiting Henlawson class 3 right lower extremity claudication.   2.  Coronary artery disease, status post PCI with recent episode of stress-induced cardiomyopathy.   3.  Hypertension.   4.  Chronic ongoing tobacco abuse.   5.  Hyperlipidemia.   6.  Obesity with a BMI of 35 today.       Plan     She will continue to increase her walking program.  She also wishes to proceed with endovascular intervention to her  of the right common iliac artery.  I told her we will plan for left radial and right common femoral access and attempt to cross her  followed by PTA and covered stent placement. The risks and benefits of the procedure were reviewed in significant detail with Ms. Sprague, and she wishes to proceed.  I informed her that there is a possibility we may not be able to cross her chronic total occlusion, and surgical bypass may need to be considered.  Extensive tobacco cessation counseling was also provided, and she states she is going to strongly attempt to quit smoking in the next few weeks.  Continue current cardiac medications for now.  I may consider starting her on low-dose Xarelto in the future, per the results of the COMPASS trial.       MD YADIRA Keller/pap      This note was transcribed by Jackie Calderon.  pap/yadira  The above service was transcribed by Jackie Calderon, and I attest to the accuracy of the note.  BAP             Electronically Signed by: Francine Calderon-, Other -Author on October 12, 2020 09:40:09 AM  Electronically Co-signed by: Christian Samuel MD -Reviewer on October 12, 2020 09:51:48 PM

## 2021-05-13 NOTE — PROGRESS NOTES
"   Progress Note      Patient Name: Betty Sprague   Patient ID: [id number]   Sex: Female   YOB: 1955    Primary Care Provider: Maico Young MD   Referring Provider: Maico Young MD    Visit Date: June 9, 2020    Provider: Andrew Mendoza MD   Location: [facility]   Location Address: [address] [place] [address], [facility]   [place], [place]  [id number]   Location Phone: [phone]          Chief Complaint     Coronary artery disease.  Hypertension.    Right leg pain.       History Of Present Illness  Betty Sprague is a 65 year old /White female with a history of coronary artery disease and PTCA/stent. She complains of some right leg pain sometimes on exertion.   CURRENT MEDICATIONS: Alprazolam t.i.d.; atenolol 50 mg daily; atorvastatin 75 mg daily; lisinopril 20 mg daily; Lipitor daily; Percocet 1-2 times daily; triamterene-hydrochlorothiazide 37.5-25 mg daily; venlafaxine 150 mg daily. No bottles. Dosage and frequency of the medications reviewed with the patient.   PAST MEDICAL HISTORY: Coronary artery disease with non-ST-myocardial infarction with PTCA/stent; Essential hypertension; Hyperlipidemia.   FAMILY HISTORY: Positive for hypertension. Negative for diabetes mellitus or heart disease.   PSYCHOSOCIAL HISTORY: Current smoker of half-a-pack per day, using nicotine patches. Rarely consumes alcohol.       Review of Systems  · Cardiovascular  o Admits  o : shortness of breath while walking or lying flat, chest pain or angina pectoris   o Denies  o : palpitations (fast, fluttering, or skipping beats), swelling (feet, ankles, hands)  · Respiratory  o Denies  o : chronic or frequent cough, asthma or wheezing      Vitals  Date Time BP Position Site L\R Cuff Size HR RR TEMP (F) WT  HT  BMI kg/m2 BSA m2 O2 Sat HC       06/09/2020 03:01 /76 Sitting    80 - R   221lbs 0oz 5'  4\" 37.93 2.13           Physical Examination  · Constitutional  o Appearance  o : Awake, " cooperative, pleasant.  · Respiratory  o Inspection of Chest  o : No chest wall deformities, moving equal.  o Auscultation of Lungs  o : Good air entry with vesicular breath sounds.  · Cardiovascular  o Heart  o :   § Auscultation of Heart  § : S1 and S2 regular. No S3. No S4. No murmurs.  o Peripheral Vascular System  o :   § Extremities  § : Peripheral pulses were well felt. No edema. No cyanosis.  · Gastrointestinal  o Abdominal Examination  o : No masses or organomegaly noted.  · Neurologic  o Mental Status Examination  o : Alert, oriented x3, within normal limits.   · Psychiatric  o Mood and Affect  o : Appear normal.           Assessment     ASSESSMENT & PLAN:    1.  Coronary artery disease status post PTCA/stent, stable.  Continue aspirin and Plavix.  2.  Essential hypertension, controlled.  Continue lisinopril.  3.  Morbid obesity.  I asked her to be on a low-fat diet to try to lose some weight.  4.  Bilateral peripheral vascular disease.  Supposed to see a vascular surgeon, which she has not seen.  Refer        her back to the vascular surgeon for peripheral vascular disease.    5.  Positive nicotine use.  Smoking cessation instructions were discussed with the patient again.    6.  See me back in 6 months.             Electronically Signed by: Darlene Colón-, Other -Author on Jacquelyn 15, 2020 03:03:40 PM  Electronically Co-signed by: Andrew Mendoza MD -Reviewer on June 18, 2020 09:22:22 AM

## 2021-05-14 NOTE — PROGRESS NOTES
"   Progress Note      Patient Name: Betty Sprague   Patient ID: [id number]   Sex: Female   YOB: 1955    Primary Care Provider: Maico Young MD   Referring Provider: Maico Young MD    Visit Date: February 26, 2021    Provider: Andrew Mendoza MD   Location: [facility]   Location Address: [address] [place] [address], [address]   [place], [place]  [id number]   Location Phone: [phone]          Chief Complaint  · Peripheral vascular disease   · Coronary artery disease       History Of Present Illness  Betty Sprague is a 65-year-old female with history of coronary artery disease, Takotsubo cardiomyopathy, has severe peripheral vascular disease. Was supposed to get PCI with Dr. Samuel and had cancelled it. Is agreeable to it now.   CURRENT MEDICATIONS: Medication list was reviewed and is as documented.   PAST MEDICAL HISTORY: Coronary artery disease with non-ST-myocardial infarction with PTCA/stent; essential hypertension; hyperlipidemia.   PSYCHOSOCIAL HISTORY: Denies alcohol use. Currently smokes 6 cigarettes per day.       Review of Systems  · Cardiovascular  o Admits  o : shortness of breath while walking or lying flat  o Denies  o : palpitations (fast, fluttering, or skipping beats), swelling (feet, ankles, hands), chest pain or angina pectoris   · Respiratory  o Denies  o : chronic or frequent cough      Vitals  Date Time BP Position Site L\R Cuff Size HR RR TEMP (F) WT  HT  BMI kg/m2 BSA m2 O2 Sat FR L/min FiO2 HC       02/26/2021 10:17 /78 Sitting    116 - R   200lbs 0oz 5'  4\" 34.33 2.02       02/26/2021 10:17 /90 Sitting    112 - R                   Physical Examination  · Constitutional  o Appearance  o : Awake, alert, cooperative, pleasant.  · Respiratory  o Inspection of Chest  o : No chest wall deformities, moving equal.  o Auscultation of Lungs  o : Good air entry with vesicular breath sounds.  · Cardiovascular  o Heart  o :   § Auscultation of Heart  § : S1 and S2 " regular. No S3. No S4. No murmurs.  o Peripheral Vascular System  o :   § Extremities  § : Peripheral pulses were well felt. No edema. No cyanosis.  · Gastrointestinal  o Abdominal Examination  o : No masses or organomegaly noted.          Assessment     ASSESSMENT AND PLAN:  1.  Coronary artery disease, status post PTCI/stent, stable. Continue Brilinta and aspirin.   2.  Severe bilateral peripheral vascular disease. Further workup and PCI a per Dr. Samuel. The patient is        agreeable for further workup and PCI of the peripheral vascular arteries.   3.  Essential hypertension, controlled.  Continue current dose of Losartan and Carvedilol. Moderate blood        pressure regularly. Be on a low-salt diet.  4.  Positive for nicotine use. Smoking cessation instructions discussed with the patient again at length. She        understands.      Andrew Mendoza MD  ROSAURA/pap             Electronically Signed by: Francine Calderon-, Other -Author on March 8, 2021 08:16:07 AM  Electronically Co-signed by: Andrew Mendoza MD -Reviewer on March 14, 2021 06:21:37 PM

## 2021-05-14 NOTE — PROGRESS NOTES
"   Progress Note      Patient Name: Betty Sprague   Patient ID: [id number]   Sex: Female   YOB: 1955    Primary Care Provider: Maico Young MD   Referring Provider: Maico Young MD    Visit Date: April 6, 2021    Provider: Andrew Mendoza MD   [facility]: [facility]   Location Address: [address] [place] [address], [address]   [place], [place]  [id number]   Location Phone: [phone]          Chief Complaint     Coronary artery disease, peripheral vascular disease.       History Of Present Illness  Betty Sprague is a 65 year old /White female with a history of coronary artery disease, takotsubo cardiomyopathy, peripheral vascular disease, status post percutaneous coronary intervention of the iliacs by Dr. Samuel. Feels better. She has some radicular pain, radiating into the left leg.   CURRENT MEDICATIONS: Medication list was reviewed and is as documented.   PAST MEDICAL HISTORY: Coronary artery disease with non-ST-myocardial infarction with PTCA/stent; essential hypertension; hyperlipidemia.   PSYCHOSOCIAL HISTORY: Denies alcohol use. Denies tobacco use.      ALLERGIES: No known drug allergies.       Review of Systems  · Cardiovascular  o Admits  o : swelling (feet, ankles, hands)  o Denies  o : palpitations (fast, fluttering, or skipping beats), shortness of breath while walking or lying flat, chest pain or angina pectoris   · Respiratory  o Denies  o : chronic or frequent cough      Vitals  Date Time BP Position Site L\R Cuff Size HR RR TEMP (F) WT  HT  BMI kg/m2 BSA m2 O2 Sat FR L/min FiO2 HC       04/06/2021 12:00 /98 Sitting    60 - R   203lbs 16oz 5'  4\" 35.02 2.04             Physical Examination  · Constitutional  o Appearance  o : Awake, alert, cooperative, pleasant.  · Respiratory  o Inspection of Chest  o : No chest wall deformities, moving equal.  o Auscultation of Lungs  o : Good air entry with vesicular breath sounds.  · Cardiovascular  o Heart  o :   § Auscultation " of Heart  § : S1 and S2 regular. No S3. No S4. No murmurs.  o Peripheral Vascular System  o :   § Extremities  § : Peripheral pulses were well felt. No edema. No cyanosis.  · Gastrointestinal  o Abdominal Examination  o : No masses or organomegaly noted.          Assessment     1.  Coronary artery disease, status post PTCA/stent, stable. Continue current dose of Brilinta and aspirin.   2.  Bilateral peripheral vascular disease, status post percutaneous coronary intervention of the right iliac artery.   3.  Sciatic pain. I asked her to follow-up with her PMD for MRI and further workup. Positive for nicotine use.   4.  Smoking cessation is discussed with the patient again.     FOLLOW-UP: 6-months.      Andrew Mendoza MD  ROSAURA/vh               Electronically Signed by: Liberty Stone-, OT -Author on April 15, 2021 06:02:53 AM  Electronically Co-signed by: Andrew Mendoza MD -Reviewer on April 29, 2021 09:45:19 AM

## 2021-06-22 NOTE — PROGRESS NOTES
"Chief Complaint  Follow-up of the Right Hip     Subjective      Btety Sprague presents to [facility] for a follow-up of right hip. Patient has a history of right hip arthroplasty. Patient has been treated for increasing right hip pain that causes her occasional leg cramps. She had been having right hip pain after falling into a hole with the right side of her body. She states that since her last visit with us, her right hip pain symptoms have been improving. She states she has a dull pain in her right hip. She states her pain is on and off at this time. Patient does complain of left hip pain. She states that she does have issues with it at time.     No Known Allergies     Social History     Socioeconomic History   • Marital status:      Spouse name: Not on file   • Number of children: Not on file   • Years of education: Not on file   • Highest education level: Not on file   Tobacco Use   • Smoking status: Current Every Day Smoker     Packs/day: 0.50     Types: Cigarettes   • Smokeless tobacco: Never Used   Substance and Sexual Activity   • Alcohol use: Never   • Drug use: Never        Review of Systems     Objective   Vital Signs:   Pulse 95   Ht 162.6 cm (64\")   Wt 95.3 kg (210 lb 3.2 oz)   SpO2 96%   BMI 36.08 kg/m²       Physical Exam  Constitutional:       Appearance: Normal appearance. He is well-developed and normal weight.   HENT:      Head: Normocephalic.      Right Ear: Hearing and external ear normal.      Left Ear: Hearing and external ear normal.      Nose: Nose normal.   Eyes:      Conjunctiva/sclera: Conjunctivae normal.   Cardiovascular:      Rate and Rhythm: Normal rate.   Pulmonary:      Effort: Pulmonary effort is normal.      Breath sounds: No wheezing or rales.   Abdominal:      Palpations: Abdomen is soft.      Tenderness: There is no abdominal tenderness.   Musculoskeletal:      Cervical back: Normal range of motion.   Skin:     Findings: No rash. "   Neurological:      Mental Status: He is alert and oriented to person, place, and time.   Psychiatric:         Mood and Affect: Mood and affect normal.         Judgment: Judgment normal.       Ortho Exam      RIGHT HIP: Ambulation assistance with cane. Skin intact. Good tone of hip flexors, hip extensors, hip adductor, hip abductors. Good strength to hamstrings, quadriceps, dorsiflexors and plantar flexors. Well healed scar. No swelling, skin discoloration or atrophy. Calf supple, non-tender. Dorsal Pedal Pulse 2+, posteriror tibialis pulse 2+. Full passive hip range of motion. No groin pain with hip range of motion.     LEFT HIP: Good tone of hip flexors, hip extensors, hip adductor, hip abductors. Good strength to hamstrings, quadriceps, dorsiflexors and plantar flexors. Moderate hip and pelvic muscle tenderness. No swelling, skin discoloration or atrophy. Skin intact. Calf supple, non-tender. Dorsal Pedal Pulse 2+, posteriror tibialis pulse 2+.     Procedures      Imaging Results (Most Recent)     Procedure Component Value Units Date/Time    XR Hip With or Without Pelvis 2 - 3 View Right [[id number]] Resulted: 06/22/21 1407     Updated: 06/22/21 1417           Result Review :       X-Ray Report:  Right hip(s) X-Ray  Indication: Evaluation of right hip  AP and Lateral view(s)  Findings: Demonstrates an intact right total hip arthroplasty with no signs of wearing or loosening. There are advancing degenerative changes of the left hip that is noted.   Prior studies available for comparison: yes          Assessment and Plan     DX: Right total hip arthroplasty     Discussed treatment plans with the patient. Patient is to progress back into activities as tolerated.      Call or return if worsening symptoms.    Follow Up     PRN.       Patient was given instructions and counseling regarding her condition or for health maintenance advice. Please see specific information pulled into the AVS if appropriate.     Scribed for  Amanda Figueroa MD by Jaylin Obrien.  06/22/21   14:03 EDT    I have personally performed the services described in this document as scribed by the above individual and it is both accurate and complete.  Amanda Figueroa MD 06/22/21  14:03 EDT

## 2021-11-10 PROBLEM — I42.0 NONISCHEMIC DILATED CARDIOMYOPATHY (HCC): Status: ACTIVE | Noted: 2021-01-01

## 2021-11-10 PROBLEM — I25.10 CORONARY ARTERY DISEASE INVOLVING NATIVE CORONARY ARTERY OF NATIVE HEART WITHOUT ANGINA PECTORIS: Status: ACTIVE | Noted: 2021-01-01

## 2021-11-10 PROBLEM — E78.2 HYPERLIPEMIA, MIXED: Status: ACTIVE | Noted: 2021-01-01

## 2021-12-08 PROBLEM — Z95.5 HISTORY OF CORONARY ANGIOPLASTY WITH INSERTION OF STENT: Status: ACTIVE | Noted: 2021-01-01

## 2022-01-01 ENCOUNTER — APPOINTMENT (OUTPATIENT)
Dept: GENERAL RADIOLOGY | Facility: HOSPITAL | Age: 67
End: 2022-01-01

## 2022-01-01 ENCOUNTER — READMISSION MANAGEMENT (OUTPATIENT)
Dept: CALL CENTER | Facility: HOSPITAL | Age: 67
End: 2022-01-01

## 2022-01-01 ENCOUNTER — TELEPHONE (OUTPATIENT)
Dept: CARDIOLOGY | Facility: CLINIC | Age: 67
End: 2022-01-01

## 2022-01-01 ENCOUNTER — HOSPITAL ENCOUNTER (OUTPATIENT)
Facility: HOSPITAL | Age: 67
Setting detail: OBSERVATION
Discharge: HOME | End: 2022-02-06
Attending: EMERGENCY MEDICINE | Admitting: INTERNAL MEDICINE

## 2022-01-01 ENCOUNTER — PREP FOR SURGERY (OUTPATIENT)
Dept: OTHER | Facility: HOSPITAL | Age: 67
End: 2022-01-01

## 2022-01-01 ENCOUNTER — HOSPITAL ENCOUNTER (INPATIENT)
Facility: HOSPITAL | Age: 67
LOS: 3 days | Discharge: HOME | End: 2022-02-11
Attending: EMERGENCY MEDICINE | Admitting: INTERNAL MEDICINE

## 2022-01-01 ENCOUNTER — OFFICE VISIT (OUTPATIENT)
Dept: CARDIOLOGY | Facility: CLINIC | Age: 67
End: 2022-01-01

## 2022-01-01 ENCOUNTER — TELEPHONE (OUTPATIENT)
Dept: ORTHOPEDIC SURGERY | Facility: CLINIC | Age: 67
End: 2022-01-01

## 2022-01-01 ENCOUNTER — OFFICE VISIT (OUTPATIENT)
Dept: ORTHOPEDIC SURGERY | Facility: CLINIC | Age: 67
End: 2022-01-01

## 2022-01-01 ENCOUNTER — HOSPITAL ENCOUNTER (EMERGENCY)
Facility: HOSPITAL | Age: 67
Discharge: HOME | End: 2022-03-31
Attending: EMERGENCY MEDICINE | Admitting: EMERGENCY MEDICINE

## 2022-01-01 VITALS — WEIGHT: 220 LBS | HEIGHT: 64 IN | BODY MASS INDEX: 37.56 KG/M2

## 2022-01-01 VITALS
TEMPERATURE: 97.5 F | HEIGHT: 64 IN | WEIGHT: 220.46 LBS | OXYGEN SATURATION: 96 % | RESPIRATION RATE: 20 BRPM | HEART RATE: 72 BPM | SYSTOLIC BLOOD PRESSURE: 172 MMHG | BODY MASS INDEX: 37.64 KG/M2 | DIASTOLIC BLOOD PRESSURE: 87 MMHG

## 2022-01-01 VITALS
SYSTOLIC BLOOD PRESSURE: 157 MMHG | WEIGHT: 227.96 LBS | RESPIRATION RATE: 16 BRPM | HEIGHT: 64 IN | OXYGEN SATURATION: 94 % | HEART RATE: 89 BPM | DIASTOLIC BLOOD PRESSURE: 86 MMHG | BODY MASS INDEX: 38.92 KG/M2 | TEMPERATURE: 97.5 F

## 2022-01-01 VITALS
BODY MASS INDEX: 37.64 KG/M2 | HEART RATE: 71 BPM | RESPIRATION RATE: 20 BRPM | OXYGEN SATURATION: 94 % | SYSTOLIC BLOOD PRESSURE: 155 MMHG | WEIGHT: 220.46 LBS | HEIGHT: 64 IN | DIASTOLIC BLOOD PRESSURE: 75 MMHG | TEMPERATURE: 97.5 F

## 2022-01-01 VITALS
BODY MASS INDEX: 40.12 KG/M2 | WEIGHT: 235 LBS | DIASTOLIC BLOOD PRESSURE: 50 MMHG | SYSTOLIC BLOOD PRESSURE: 114 MMHG | HEIGHT: 64 IN | HEART RATE: 60 BPM

## 2022-01-01 DIAGNOSIS — Z78.9 DECREASED ACTIVITIES OF DAILY LIVING (ADL): ICD-10-CM

## 2022-01-01 DIAGNOSIS — E78.2 HYPERLIPEMIA, MIXED: ICD-10-CM

## 2022-01-01 DIAGNOSIS — Z79.4 TYPE 2 DIABETES MELLITUS WITHOUT COMPLICATION, WITH LONG-TERM CURRENT USE OF INSULIN: ICD-10-CM

## 2022-01-01 DIAGNOSIS — M25.552 LEFT HIP PAIN: ICD-10-CM

## 2022-01-01 DIAGNOSIS — J81.1 CHRONIC PULMONARY EDEMA: Primary | ICD-10-CM

## 2022-01-01 DIAGNOSIS — R26.2 DIFFICULTY IN WALKING: ICD-10-CM

## 2022-01-01 DIAGNOSIS — I25.10 CORONARY ARTERY DISEASE INVOLVING NATIVE CORONARY ARTERY OF NATIVE HEART WITHOUT ANGINA PECTORIS: Primary | ICD-10-CM

## 2022-01-01 DIAGNOSIS — E11.9 TYPE 2 DIABETES MELLITUS WITHOUT COMPLICATION, WITH LONG-TERM CURRENT USE OF INSULIN: ICD-10-CM

## 2022-01-01 DIAGNOSIS — M16.12 PRIMARY OSTEOARTHRITIS OF LEFT HIP: Primary | ICD-10-CM

## 2022-01-01 DIAGNOSIS — Z95.5 HISTORY OF CORONARY ANGIOPLASTY WITH INSERTION OF STENT: ICD-10-CM

## 2022-01-01 DIAGNOSIS — I10 HYPERTENSION, ESSENTIAL: ICD-10-CM

## 2022-01-01 DIAGNOSIS — I16.0 HYPERTENSIVE URGENCY: Primary | ICD-10-CM

## 2022-01-01 DIAGNOSIS — I16.1 HYPERTENSIVE EMERGENCY: Primary | ICD-10-CM

## 2022-01-01 LAB
ALBUMIN SERPL-MCNC: 3.4 G/DL (ref 3.5–5.2)
ALBUMIN SERPL-MCNC: 4 G/DL (ref 3.5–5.2)
ALBUMIN SERPL-MCNC: 4.4 G/DL (ref 3.5–5.2)
ALBUMIN SERPL-MCNC: 4.5 G/DL (ref 3.5–5.2)
ALBUMIN/GLOB SERPL: 1.2 G/DL
ALBUMIN/GLOB SERPL: 1.3 G/DL
ALBUMIN/GLOB SERPL: 1.4 G/DL
ALBUMIN/GLOB SERPL: 1.7 G/DL
ALP SERPL-CCNC: 62 U/L (ref 39–117)
ALP SERPL-CCNC: 75 U/L (ref 39–117)
ALP SERPL-CCNC: 80 U/L (ref 39–117)
ALP SERPL-CCNC: 83 U/L (ref 39–117)
ALT SERPL W P-5'-P-CCNC: 11 U/L (ref 1–33)
ALT SERPL W P-5'-P-CCNC: 17 U/L (ref 1–33)
ALT SERPL W P-5'-P-CCNC: 18 U/L (ref 1–33)
ALT SERPL W P-5'-P-CCNC: 8 U/L (ref 1–33)
ANION GAP SERPL CALCULATED.3IONS-SCNC: 7.9 MMOL/L (ref 5–15)
ANION GAP SERPL CALCULATED.3IONS-SCNC: 9 MMOL/L (ref 5–15)
ANION GAP SERPL CALCULATED.3IONS-SCNC: 9.2 MMOL/L (ref 5–15)
ANION GAP SERPL CALCULATED.3IONS-SCNC: 9.3 MMOL/L (ref 5–15)
ANION GAP SERPL CALCULATED.3IONS-SCNC: 9.3 MMOL/L (ref 5–15)
ANION GAP SERPL CALCULATED.3IONS-SCNC: 9.5 MMOL/L (ref 5–15)
ANION GAP SERPL CALCULATED.3IONS-SCNC: 9.7 MMOL/L (ref 5–15)
ANION GAP SERPL CALCULATED.3IONS-SCNC: 9.8 MMOL/L (ref 5–15)
AST SERPL-CCNC: 13 U/L (ref 1–32)
AST SERPL-CCNC: 15 U/L (ref 1–32)
AST SERPL-CCNC: 19 U/L (ref 1–32)
AST SERPL-CCNC: 22 U/L (ref 1–32)
BACTERIA UR QL AUTO: ABNORMAL /HPF
BASOPHILS # BLD AUTO: 0.04 10*3/MM3 (ref 0–0.2)
BASOPHILS # BLD AUTO: 0.05 10*3/MM3 (ref 0–0.2)
BASOPHILS NFR BLD AUTO: 0.4 % (ref 0–1.5)
BASOPHILS NFR BLD AUTO: 0.5 % (ref 0–1.5)
BASOPHILS NFR BLD AUTO: 0.6 % (ref 0–1.5)
BILIRUB SERPL-MCNC: 0.4 MG/DL (ref 0–1.2)
BILIRUB SERPL-MCNC: 0.5 MG/DL (ref 0–1.2)
BILIRUB UR QL STRIP: NEGATIVE
BUN SERPL-MCNC: 18 MG/DL (ref 8–23)
BUN SERPL-MCNC: 18 MG/DL (ref 8–23)
BUN SERPL-MCNC: 19 MG/DL (ref 8–23)
BUN SERPL-MCNC: 20 MG/DL (ref 8–23)
BUN SERPL-MCNC: 20 MG/DL (ref 8–23)
BUN SERPL-MCNC: 22 MG/DL (ref 8–23)
BUN SERPL-MCNC: 22 MG/DL (ref 8–23)
BUN SERPL-MCNC: 24 MG/DL (ref 8–23)
BUN/CREAT SERPL: 19 (ref 7–25)
BUN/CREAT SERPL: 19.1 (ref 7–25)
BUN/CREAT SERPL: 20 (ref 7–25)
BUN/CREAT SERPL: 20.5 (ref 7–25)
BUN/CREAT SERPL: 20.6 (ref 7–25)
BUN/CREAT SERPL: 21.4 (ref 7–25)
BUN/CREAT SERPL: 21.8 (ref 7–25)
BUN/CREAT SERPL: 23.8 (ref 7–25)
CALCIUM SPEC-SCNC: 10 MG/DL (ref 8.6–10.5)
CALCIUM SPEC-SCNC: 8.8 MG/DL (ref 8.6–10.5)
CALCIUM SPEC-SCNC: 9.1 MG/DL (ref 8.6–10.5)
CALCIUM SPEC-SCNC: 9.5 MG/DL (ref 8.6–10.5)
CALCIUM SPEC-SCNC: 9.6 MG/DL (ref 8.6–10.5)
CALCIUM SPEC-SCNC: 9.7 MG/DL (ref 8.6–10.5)
CHLORIDE SERPL-SCNC: 101 MMOL/L (ref 98–107)
CHLORIDE SERPL-SCNC: 101 MMOL/L (ref 98–107)
CHLORIDE SERPL-SCNC: 102 MMOL/L (ref 98–107)
CHLORIDE SERPL-SCNC: 104 MMOL/L (ref 98–107)
CHLORIDE SERPL-SCNC: 104 MMOL/L (ref 98–107)
CLARITY UR: CLEAR
CO2 SERPL-SCNC: 26 MMOL/L (ref 22–29)
CO2 SERPL-SCNC: 26.3 MMOL/L (ref 22–29)
CO2 SERPL-SCNC: 26.8 MMOL/L (ref 22–29)
CO2 SERPL-SCNC: 27.2 MMOL/L (ref 22–29)
CO2 SERPL-SCNC: 29.5 MMOL/L (ref 22–29)
CO2 SERPL-SCNC: 30.7 MMOL/L (ref 22–29)
CO2 SERPL-SCNC: 31.7 MMOL/L (ref 22–29)
CO2 SERPL-SCNC: 32.1 MMOL/L (ref 22–29)
COLOR UR: YELLOW
CREAT SERPL-MCNC: 0.88 MG/DL (ref 0.57–1)
CREAT SERPL-MCNC: 0.94 MG/DL (ref 0.57–1)
CREAT SERPL-MCNC: 0.97 MG/DL (ref 0.57–1)
CREAT SERPL-MCNC: 1 MG/DL (ref 0.57–1)
CREAT SERPL-MCNC: 1 MG/DL (ref 0.57–1)
CREAT SERPL-MCNC: 1.01 MG/DL (ref 0.57–1)
CREAT SERPL-MCNC: 1.01 MG/DL (ref 0.57–1)
CREAT SERPL-MCNC: 1.03 MG/DL (ref 0.57–1)
DEPRECATED RDW RBC AUTO: 43.8 FL (ref 37–54)
DEPRECATED RDW RBC AUTO: 44.2 FL (ref 37–54)
DEPRECATED RDW RBC AUTO: 45.1 FL (ref 37–54)
DEPRECATED RDW RBC AUTO: 45.3 FL (ref 37–54)
DEPRECATED RDW RBC AUTO: 45.6 FL (ref 37–54)
DEPRECATED RDW RBC AUTO: 45.6 FL (ref 37–54)
DEPRECATED RDW RBC AUTO: 45.7 FL (ref 37–54)
DEPRECATED RDW RBC AUTO: 45.7 FL (ref 37–54)
EGFRCR SERPLBLD CKD-EPI 2021: 64.6 ML/MIN/1.73
EOSINOPHIL # BLD AUTO: 0.16 10*3/MM3 (ref 0–0.4)
EOSINOPHIL # BLD AUTO: 0.18 10*3/MM3 (ref 0–0.4)
EOSINOPHIL # BLD AUTO: 0.21 10*3/MM3 (ref 0–0.4)
EOSINOPHIL # BLD AUTO: 0.22 10*3/MM3 (ref 0–0.4)
EOSINOPHIL # BLD AUTO: 0.24 10*3/MM3 (ref 0–0.4)
EOSINOPHIL # BLD AUTO: 0.25 10*3/MM3 (ref 0–0.4)
EOSINOPHIL NFR BLD AUTO: 2.1 % (ref 0.3–6.2)
EOSINOPHIL NFR BLD AUTO: 2.4 % (ref 0.3–6.2)
EOSINOPHIL NFR BLD AUTO: 2.4 % (ref 0.3–6.2)
EOSINOPHIL NFR BLD AUTO: 2.5 % (ref 0.3–6.2)
EOSINOPHIL NFR BLD AUTO: 2.6 % (ref 0.3–6.2)
EOSINOPHIL NFR BLD AUTO: 3 % (ref 0.3–6.2)
ERYTHROCYTE [DISTWIDTH] IN BLOOD BY AUTOMATED COUNT: 13.3 % (ref 12.3–15.4)
ERYTHROCYTE [DISTWIDTH] IN BLOOD BY AUTOMATED COUNT: 13.5 % (ref 12.3–15.4)
ERYTHROCYTE [DISTWIDTH] IN BLOOD BY AUTOMATED COUNT: 13.5 % (ref 12.3–15.4)
ERYTHROCYTE [DISTWIDTH] IN BLOOD BY AUTOMATED COUNT: 13.6 % (ref 12.3–15.4)
ERYTHROCYTE [DISTWIDTH] IN BLOOD BY AUTOMATED COUNT: 13.6 % (ref 12.3–15.4)
ERYTHROCYTE [DISTWIDTH] IN BLOOD BY AUTOMATED COUNT: 13.8 % (ref 12.3–15.4)
GFR SERPL CREATININE-BSD FRML MDRD: 54 ML/MIN/1.73
GFR SERPL CREATININE-BSD FRML MDRD: 55 ML/MIN/1.73
GFR SERPL CREATININE-BSD FRML MDRD: 60 ML/MIN/1.73
GFR SERPL CREATININE-BSD FRML MDRD: 64 ML/MIN/1.73
GLOBULIN UR ELPH-MCNC: 2.7 GM/DL
GLOBULIN UR ELPH-MCNC: 2.8 GM/DL
GLOBULIN UR ELPH-MCNC: 2.8 GM/DL
GLOBULIN UR ELPH-MCNC: 3.4 GM/DL
GLUCOSE BLDC GLUCOMTR-MCNC: 102 MG/DL (ref 70–99)
GLUCOSE BLDC GLUCOMTR-MCNC: 105 MG/DL (ref 70–99)
GLUCOSE BLDC GLUCOMTR-MCNC: 107 MG/DL (ref 70–99)
GLUCOSE BLDC GLUCOMTR-MCNC: 117 MG/DL (ref 70–99)
GLUCOSE BLDC GLUCOMTR-MCNC: 122 MG/DL (ref 70–99)
GLUCOSE BLDC GLUCOMTR-MCNC: 123 MG/DL (ref 70–99)
GLUCOSE BLDC GLUCOMTR-MCNC: 126 MG/DL (ref 70–99)
GLUCOSE BLDC GLUCOMTR-MCNC: 129 MG/DL (ref 70–99)
GLUCOSE BLDC GLUCOMTR-MCNC: 134 MG/DL (ref 70–99)
GLUCOSE BLDC GLUCOMTR-MCNC: 137 MG/DL (ref 70–99)
GLUCOSE BLDC GLUCOMTR-MCNC: 140 MG/DL (ref 70–99)
GLUCOSE BLDC GLUCOMTR-MCNC: 146 MG/DL (ref 70–99)
GLUCOSE BLDC GLUCOMTR-MCNC: 151 MG/DL (ref 70–99)
GLUCOSE BLDC GLUCOMTR-MCNC: 188 MG/DL (ref 70–99)
GLUCOSE BLDC GLUCOMTR-MCNC: 92 MG/DL (ref 70–99)
GLUCOSE BLDC GLUCOMTR-MCNC: 92 MG/DL (ref 70–99)
GLUCOSE SERPL-MCNC: 110 MG/DL (ref 65–99)
GLUCOSE SERPL-MCNC: 122 MG/DL (ref 65–99)
GLUCOSE SERPL-MCNC: 125 MG/DL (ref 65–99)
GLUCOSE SERPL-MCNC: 139 MG/DL (ref 65–99)
GLUCOSE SERPL-MCNC: 141 MG/DL (ref 65–99)
GLUCOSE SERPL-MCNC: 142 MG/DL (ref 65–99)
GLUCOSE SERPL-MCNC: 145 MG/DL (ref 65–99)
GLUCOSE SERPL-MCNC: 94 MG/DL (ref 65–99)
GLUCOSE UR STRIP-MCNC: NEGATIVE MG/DL
HBA1C MFR BLD: 6.4 % (ref 4.8–5.6)
HCT VFR BLD AUTO: 32 % (ref 34–46.6)
HCT VFR BLD AUTO: 32.8 % (ref 34–46.6)
HCT VFR BLD AUTO: 33.8 % (ref 34–46.6)
HCT VFR BLD AUTO: 34.1 % (ref 34–46.6)
HCT VFR BLD AUTO: 35.1 % (ref 34–46.6)
HCT VFR BLD AUTO: 35.4 % (ref 34–46.6)
HCT VFR BLD AUTO: 35.6 % (ref 34–46.6)
HCT VFR BLD AUTO: 38.6 % (ref 34–46.6)
HGB BLD-MCNC: 10.6 G/DL (ref 12–15.9)
HGB BLD-MCNC: 10.6 G/DL (ref 12–15.9)
HGB BLD-MCNC: 11.2 G/DL (ref 12–15.9)
HGB BLD-MCNC: 11.3 G/DL (ref 12–15.9)
HGB BLD-MCNC: 11.3 G/DL (ref 12–15.9)
HGB BLD-MCNC: 11.6 G/DL (ref 12–15.9)
HGB BLD-MCNC: 11.7 G/DL (ref 12–15.9)
HGB BLD-MCNC: 12.6 G/DL (ref 12–15.9)
HGB UR QL STRIP.AUTO: ABNORMAL
HOLD SPECIMEN: NORMAL
HYALINE CASTS UR QL AUTO: ABNORMAL /LPF
IMM GRANULOCYTES # BLD AUTO: 0.02 10*3/MM3 (ref 0–0.05)
IMM GRANULOCYTES # BLD AUTO: 0.03 10*3/MM3 (ref 0–0.05)
IMM GRANULOCYTES # BLD AUTO: 0.04 10*3/MM3 (ref 0–0.05)
IMM GRANULOCYTES # BLD AUTO: 0.05 10*3/MM3 (ref 0–0.05)
IMM GRANULOCYTES NFR BLD AUTO: 0.2 % (ref 0–0.5)
IMM GRANULOCYTES NFR BLD AUTO: 0.3 % (ref 0–0.5)
IMM GRANULOCYTES NFR BLD AUTO: 0.5 % (ref 0–0.5)
IMM GRANULOCYTES NFR BLD AUTO: 0.5 % (ref 0–0.5)
KETONES UR QL STRIP: NEGATIVE
LEUKOCYTE ESTERASE UR QL STRIP.AUTO: NEGATIVE
LYMPHOCYTES # BLD AUTO: 1.03 10*3/MM3 (ref 0.7–3.1)
LYMPHOCYTES # BLD AUTO: 1.13 10*3/MM3 (ref 0.7–3.1)
LYMPHOCYTES # BLD AUTO: 1.22 10*3/MM3 (ref 0.7–3.1)
LYMPHOCYTES # BLD AUTO: 1.64 10*3/MM3 (ref 0.7–3.1)
LYMPHOCYTES # BLD AUTO: 1.65 10*3/MM3 (ref 0.7–3.1)
LYMPHOCYTES # BLD AUTO: 1.96 10*3/MM3 (ref 0.7–3.1)
LYMPHOCYTES NFR BLD AUTO: 14.5 % (ref 19.6–45.3)
LYMPHOCYTES NFR BLD AUTO: 14.8 % (ref 19.6–45.3)
LYMPHOCYTES NFR BLD AUTO: 20.7 % (ref 19.6–45.3)
LYMPHOCYTES NFR BLD AUTO: 21.3 % (ref 19.6–45.3)
LYMPHOCYTES NFR BLD AUTO: 23.1 % (ref 19.6–45.3)
LYMPHOCYTES NFR BLD AUTO: 9.9 % (ref 19.6–45.3)
MAGNESIUM SERPL-MCNC: 2.1 MG/DL (ref 1.6–2.4)
MAGNESIUM SERPL-MCNC: 2.2 MG/DL (ref 1.6–2.4)
MAGNESIUM SERPL-MCNC: 2.2 MG/DL (ref 1.6–2.4)
MAGNESIUM SERPL-MCNC: 2.3 MG/DL (ref 1.6–2.4)
MAGNESIUM SERPL-MCNC: 2.3 MG/DL (ref 1.6–2.4)
MCH RBC QN AUTO: 29.6 PG (ref 26.6–33)
MCH RBC QN AUTO: 29.7 PG (ref 26.6–33)
MCH RBC QN AUTO: 30 PG (ref 26.6–33)
MCH RBC QN AUTO: 30.1 PG (ref 26.6–33)
MCH RBC QN AUTO: 30.1 PG (ref 26.6–33)
MCH RBC QN AUTO: 30.2 PG (ref 26.6–33)
MCH RBC QN AUTO: 30.3 PG (ref 26.6–33)
MCH RBC QN AUTO: 30.4 PG (ref 26.6–33)
MCHC RBC AUTO-ENTMCNC: 31.6 G/DL (ref 31.5–35.7)
MCHC RBC AUTO-ENTMCNC: 32.3 G/DL (ref 31.5–35.7)
MCHC RBC AUTO-ENTMCNC: 32.6 G/DL (ref 31.5–35.7)
MCHC RBC AUTO-ENTMCNC: 32.9 G/DL (ref 31.5–35.7)
MCHC RBC AUTO-ENTMCNC: 33 G/DL (ref 31.5–35.7)
MCHC RBC AUTO-ENTMCNC: 33.1 G/DL (ref 31.5–35.7)
MCHC RBC AUTO-ENTMCNC: 33.1 G/DL (ref 31.5–35.7)
MCHC RBC AUTO-ENTMCNC: 33.4 G/DL (ref 31.5–35.7)
MCV RBC AUTO: 90.4 FL (ref 79–97)
MCV RBC AUTO: 90.9 FL (ref 79–97)
MCV RBC AUTO: 90.9 FL (ref 79–97)
MCV RBC AUTO: 91.6 FL (ref 79–97)
MCV RBC AUTO: 91.7 FL (ref 79–97)
MCV RBC AUTO: 91.9 FL (ref 79–97)
MCV RBC AUTO: 92.2 FL (ref 79–97)
MCV RBC AUTO: 93.9 FL (ref 79–97)
MONOCYTES # BLD AUTO: 0.49 10*3/MM3 (ref 0.1–0.9)
MONOCYTES # BLD AUTO: 0.55 10*3/MM3 (ref 0.1–0.9)
MONOCYTES # BLD AUTO: 0.56 10*3/MM3 (ref 0.1–0.9)
MONOCYTES # BLD AUTO: 0.61 10*3/MM3 (ref 0.1–0.9)
MONOCYTES # BLD AUTO: 0.65 10*3/MM3 (ref 0.1–0.9)
MONOCYTES # BLD AUTO: 0.7 10*3/MM3 (ref 0.1–0.9)
MONOCYTES NFR BLD AUTO: 5.9 % (ref 5–12)
MONOCYTES NFR BLD AUTO: 6.3 % (ref 5–12)
MONOCYTES NFR BLD AUTO: 6.7 % (ref 5–12)
MONOCYTES NFR BLD AUTO: 7.6 % (ref 5–12)
MONOCYTES NFR BLD AUTO: 7.9 % (ref 5–12)
MONOCYTES NFR BLD AUTO: 8.1 % (ref 5–12)
NEUTROPHILS NFR BLD AUTO: 4.65 10*3/MM3 (ref 1.7–7)
NEUTROPHILS NFR BLD AUTO: 5.38 10*3/MM3 (ref 1.7–7)
NEUTROPHILS NFR BLD AUTO: 5.93 10*3/MM3 (ref 1.7–7)
NEUTROPHILS NFR BLD AUTO: 6.18 10*3/MM3 (ref 1.7–7)
NEUTROPHILS NFR BLD AUTO: 6.23 10*3/MM3 (ref 1.7–7)
NEUTROPHILS NFR BLD AUTO: 65.6 % (ref 42.7–76)
NEUTROPHILS NFR BLD AUTO: 67.4 % (ref 42.7–76)
NEUTROPHILS NFR BLD AUTO: 67.9 % (ref 42.7–76)
NEUTROPHILS NFR BLD AUTO: 75.2 % (ref 42.7–76)
NEUTROPHILS NFR BLD AUTO: 76.1 % (ref 42.7–76)
NEUTROPHILS NFR BLD AUTO: 8.43 10*3/MM3 (ref 1.7–7)
NEUTROPHILS NFR BLD AUTO: 80.9 % (ref 42.7–76)
NITRITE UR QL STRIP: NEGATIVE
NRBC BLD AUTO-RTO: 0 /100 WBC (ref 0–0.2)
NT-PROBNP SERPL-MCNC: 338.7 PG/ML (ref 0–900)
NT-PROBNP SERPL-MCNC: 526.5 PG/ML (ref 0–900)
PH UR STRIP.AUTO: 7.5 [PH] (ref 5–8)
PLATELET # BLD AUTO: 148 10*3/MM3 (ref 140–450)
PLATELET # BLD AUTO: 154 10*3/MM3 (ref 140–450)
PLATELET # BLD AUTO: 164 10*3/MM3 (ref 140–450)
PLATELET # BLD AUTO: 173 10*3/MM3 (ref 140–450)
PLATELET # BLD AUTO: 174 10*3/MM3 (ref 140–450)
PLATELET # BLD AUTO: 178 10*3/MM3 (ref 140–450)
PLATELET # BLD AUTO: 188 10*3/MM3 (ref 140–450)
PLATELET # BLD AUTO: 194 10*3/MM3 (ref 140–450)
PMV BLD AUTO: 9 FL (ref 6–12)
PMV BLD AUTO: 9.1 FL (ref 6–12)
PMV BLD AUTO: 9.4 FL (ref 6–12)
PMV BLD AUTO: 9.5 FL (ref 6–12)
PMV BLD AUTO: 9.6 FL (ref 6–12)
PMV BLD AUTO: 9.6 FL (ref 6–12)
PMV BLD AUTO: 9.7 FL (ref 6–12)
PMV BLD AUTO: 9.8 FL (ref 6–12)
POTASSIUM SERPL-SCNC: 3.5 MMOL/L (ref 3.5–5.2)
POTASSIUM SERPL-SCNC: 3.9 MMOL/L (ref 3.5–5.2)
POTASSIUM SERPL-SCNC: 4 MMOL/L (ref 3.5–5.2)
POTASSIUM SERPL-SCNC: 4 MMOL/L (ref 3.5–5.2)
POTASSIUM SERPL-SCNC: 4.1 MMOL/L (ref 3.5–5.2)
POTASSIUM SERPL-SCNC: 4.1 MMOL/L (ref 3.5–5.2)
POTASSIUM SERPL-SCNC: 4.2 MMOL/L (ref 3.5–5.2)
POTASSIUM SERPL-SCNC: 4.3 MMOL/L (ref 3.5–5.2)
PROT SERPL-MCNC: 6.2 G/DL (ref 6–8.5)
PROT SERPL-MCNC: 6.8 G/DL (ref 6–8.5)
PROT SERPL-MCNC: 7.2 G/DL (ref 6–8.5)
PROT SERPL-MCNC: 7.8 G/DL (ref 6–8.5)
PROT UR QL STRIP: ABNORMAL
QT INTERVAL: 395 MS
QT INTERVAL: 415 MS
QT INTERVAL: 431 MS
RBC # BLD AUTO: 3.49 10*6/MM3 (ref 3.77–5.28)
RBC # BLD AUTO: 3.58 10*6/MM3 (ref 3.77–5.28)
RBC # BLD AUTO: 3.74 10*6/MM3 (ref 3.77–5.28)
RBC # BLD AUTO: 3.75 10*6/MM3 (ref 3.77–5.28)
RBC # BLD AUTO: 3.77 10*6/MM3 (ref 3.77–5.28)
RBC # BLD AUTO: 3.86 10*6/MM3 (ref 3.77–5.28)
RBC # BLD AUTO: 3.86 10*6/MM3 (ref 3.77–5.28)
RBC # BLD AUTO: 4.2 10*6/MM3 (ref 3.77–5.28)
RBC # UR STRIP: ABNORMAL /HPF
REF LAB TEST METHOD: ABNORMAL
SARS-COV-2 RNA PNL SPEC NAA+PROBE: NOT DETECTED
SODIUM SERPL-SCNC: 138 MMOL/L (ref 136–145)
SODIUM SERPL-SCNC: 139 MMOL/L (ref 136–145)
SODIUM SERPL-SCNC: 141 MMOL/L (ref 136–145)
SODIUM SERPL-SCNC: 142 MMOL/L (ref 136–145)
SODIUM SERPL-SCNC: 143 MMOL/L (ref 136–145)
SODIUM SERPL-SCNC: 143 MMOL/L (ref 136–145)
SP GR UR STRIP: 1.01 (ref 1–1.03)
SQUAMOUS #/AREA URNS HPF: ABNORMAL /HPF
TROPONIN T SERPL-MCNC: <0.01 NG/ML (ref 0–0.03)
UROBILINOGEN UR QL STRIP: ABNORMAL
WBC # UR STRIP: ABNORMAL /HPF
WBC NRBC COR # BLD: 10.41 10*3/MM3 (ref 3.4–10.8)
WBC NRBC COR # BLD: 7.09 10*3/MM3 (ref 3.4–10.8)
WBC NRBC COR # BLD: 7.37 10*3/MM3 (ref 3.4–10.8)
WBC NRBC COR # BLD: 7.79 10*3/MM3 (ref 3.4–10.8)
WBC NRBC COR # BLD: 7.98 10*3/MM3 (ref 3.4–10.8)
WBC NRBC COR # BLD: 8.07 10*3/MM3 (ref 3.4–10.8)
WBC NRBC COR # BLD: 8.22 10*3/MM3 (ref 3.4–10.8)
WBC NRBC COR # BLD: 9.19 10*3/MM3 (ref 3.4–10.8)
WHOLE BLOOD HOLD SPECIMEN: NORMAL

## 2022-01-01 PROCEDURE — 85025 COMPLETE CBC W/AUTO DIFF WBC: CPT | Performed by: STUDENT IN AN ORGANIZED HEALTH CARE EDUCATION/TRAINING PROGRAM

## 2022-01-01 PROCEDURE — 94799 UNLISTED PULMONARY SVC/PX: CPT

## 2022-01-01 PROCEDURE — 80053 COMPREHEN METABOLIC PANEL: CPT | Performed by: EMERGENCY MEDICINE

## 2022-01-01 PROCEDURE — 80048 BASIC METABOLIC PNL TOTAL CA: CPT | Performed by: INTERNAL MEDICINE

## 2022-01-01 PROCEDURE — 84484 ASSAY OF TROPONIN QUANT: CPT | Performed by: EMERGENCY MEDICINE

## 2022-01-01 PROCEDURE — 99221 1ST HOSP IP/OBS SF/LOW 40: CPT | Performed by: SPECIALIST

## 2022-01-01 PROCEDURE — 83735 ASSAY OF MAGNESIUM: CPT | Performed by: STUDENT IN AN ORGANIZED HEALTH CARE EDUCATION/TRAINING PROGRAM

## 2022-01-01 PROCEDURE — 93010 ELECTROCARDIOGRAM REPORT: CPT | Performed by: INTERNAL MEDICINE

## 2022-01-01 PROCEDURE — 83735 ASSAY OF MAGNESIUM: CPT | Performed by: INTERNAL MEDICINE

## 2022-01-01 PROCEDURE — G0378 HOSPITAL OBSERVATION PER HR: HCPCS

## 2022-01-01 PROCEDURE — 96372 THER/PROPH/DIAG INJ SC/IM: CPT

## 2022-01-01 PROCEDURE — 82962 GLUCOSE BLOOD TEST: CPT

## 2022-01-01 PROCEDURE — 85027 COMPLETE CBC AUTOMATED: CPT | Performed by: INTERNAL MEDICINE

## 2022-01-01 PROCEDURE — 99285 EMERGENCY DEPT VISIT HI MDM: CPT

## 2022-01-01 PROCEDURE — 96375 TX/PRO/DX INJ NEW DRUG ADDON: CPT

## 2022-01-01 PROCEDURE — 71045 X-RAY EXAM CHEST 1 VIEW: CPT

## 2022-01-01 PROCEDURE — 83880 ASSAY OF NATRIURETIC PEPTIDE: CPT | Performed by: EMERGENCY MEDICINE

## 2022-01-01 PROCEDURE — 96374 THER/PROPH/DIAG INJ IV PUSH: CPT

## 2022-01-01 PROCEDURE — 99220 PR INITIAL OBSERVATION CARE/DAY 70 MINUTES: CPT | Performed by: HOSPITALIST

## 2022-01-01 PROCEDURE — 99233 SBSQ HOSP IP/OBS HIGH 50: CPT | Performed by: INTERNAL MEDICINE

## 2022-01-01 PROCEDURE — 25010000002 ENOXAPARIN PER 10 MG: Performed by: INTERNAL MEDICINE

## 2022-01-01 PROCEDURE — 63710000001 INSULIN LISPRO (HUMAN) PER 5 UNITS: Performed by: INTERNAL MEDICINE

## 2022-01-01 PROCEDURE — 93005 ELECTROCARDIOGRAM TRACING: CPT

## 2022-01-01 PROCEDURE — 63710000001 INSULIN DETEMIR PER 5 UNITS: Performed by: INTERNAL MEDICINE

## 2022-01-01 PROCEDURE — 93005 ELECTROCARDIOGRAM TRACING: CPT | Performed by: EMERGENCY MEDICINE

## 2022-01-01 PROCEDURE — 99239 HOSP IP/OBS DSCHRG MGMT >30: CPT | Performed by: INTERNAL MEDICINE

## 2022-01-01 PROCEDURE — 85025 COMPLETE CBC W/AUTO DIFF WBC: CPT | Performed by: EMERGENCY MEDICINE

## 2022-01-01 PROCEDURE — 99217 PR OBSERVATION CARE DISCHARGE MANAGEMENT: CPT | Performed by: INTERNAL MEDICINE

## 2022-01-01 PROCEDURE — 25010000002 HYDRALAZINE PER 20 MG: Performed by: EMERGENCY MEDICINE

## 2022-01-01 PROCEDURE — 94640 AIRWAY INHALATION TREATMENT: CPT

## 2022-01-01 PROCEDURE — 99284 EMERGENCY DEPT VISIT MOD MDM: CPT

## 2022-01-01 PROCEDURE — 99214 OFFICE O/P EST MOD 30 MIN: CPT | Performed by: SPECIALIST

## 2022-01-01 PROCEDURE — 97161 PT EVAL LOW COMPLEX 20 MIN: CPT

## 2022-01-01 PROCEDURE — 99283 EMERGENCY DEPT VISIT LOW MDM: CPT

## 2022-01-01 PROCEDURE — 83735 ASSAY OF MAGNESIUM: CPT | Performed by: HOSPITALIST

## 2022-01-01 PROCEDURE — 25010000002 FUROSEMIDE PER 20 MG: Performed by: EMERGENCY MEDICINE

## 2022-01-01 PROCEDURE — 80048 BASIC METABOLIC PNL TOTAL CA: CPT | Performed by: HOSPITALIST

## 2022-01-01 PROCEDURE — 81001 URINALYSIS AUTO W/SCOPE: CPT | Performed by: INTERNAL MEDICINE

## 2022-01-01 PROCEDURE — 99213 OFFICE O/P EST LOW 20 MIN: CPT | Performed by: ORTHOPAEDIC SURGERY

## 2022-01-01 PROCEDURE — 25010000002 HYDRALAZINE PER 20 MG: Performed by: NURSE PRACTITIONER

## 2022-01-01 PROCEDURE — 83036 HEMOGLOBIN GLYCOSYLATED A1C: CPT | Performed by: INTERNAL MEDICINE

## 2022-01-01 PROCEDURE — U0004 COV-19 TEST NON-CDC HGH THRU: HCPCS | Performed by: INTERNAL MEDICINE

## 2022-01-01 PROCEDURE — 36415 COLL VENOUS BLD VENIPUNCTURE: CPT | Performed by: INTERNAL MEDICINE

## 2022-01-01 PROCEDURE — 99232 SBSQ HOSP IP/OBS MODERATE 35: CPT | Performed by: INTERNAL MEDICINE

## 2022-01-01 PROCEDURE — 99223 1ST HOSP IP/OBS HIGH 75: CPT | Performed by: INTERNAL MEDICINE

## 2022-01-01 PROCEDURE — 36415 COLL VENOUS BLD VENIPUNCTURE: CPT | Performed by: HOSPITALIST

## 2022-01-01 PROCEDURE — 25010000002 DIAZEPAM PER 5 MG: Performed by: EMERGENCY MEDICINE

## 2022-01-01 PROCEDURE — 80053 COMPREHEN METABOLIC PANEL: CPT | Performed by: STUDENT IN AN ORGANIZED HEALTH CARE EDUCATION/TRAINING PROGRAM

## 2022-01-01 PROCEDURE — 80053 COMPREHEN METABOLIC PANEL: CPT | Performed by: NURSE PRACTITIONER

## 2022-01-01 PROCEDURE — 93010 ELECTROCARDIOGRAM REPORT: CPT | Performed by: SPECIALIST

## 2022-01-01 PROCEDURE — 25010000002 ENOXAPARIN PER 10 MG: Performed by: HOSPITALIST

## 2022-01-01 PROCEDURE — 85025 COMPLETE CBC W/AUTO DIFF WBC: CPT | Performed by: HOSPITALIST

## 2022-01-01 PROCEDURE — 97165 OT EVAL LOW COMPLEX 30 MIN: CPT

## 2022-01-01 PROCEDURE — 85025 COMPLETE CBC W/AUTO DIFF WBC: CPT | Performed by: NURSE PRACTITIONER

## 2022-01-01 PROCEDURE — 99232 SBSQ HOSP IP/OBS MODERATE 35: CPT | Performed by: SPECIALIST

## 2022-01-01 PROCEDURE — 84484 ASSAY OF TROPONIN QUANT: CPT | Performed by: INTERNAL MEDICINE

## 2022-01-01 PROCEDURE — 25010000002 LORAZEPAM PER 2 MG: Performed by: EMERGENCY MEDICINE

## 2022-01-01 PROCEDURE — 85025 COMPLETE CBC W/AUTO DIFF WBC: CPT | Performed by: INTERNAL MEDICINE

## 2022-01-01 RX ORDER — FUROSEMIDE 40 MG/1
40 TABLET ORAL DAILY
Qty: 30 TABLET | Refills: 0 | Status: SHIPPED | OUTPATIENT
Start: 2022-01-01

## 2022-01-01 RX ORDER — POLYETHYLENE GLYCOL 3350 17 G/17G
17 POWDER, FOR SOLUTION ORAL DAILY PRN
Status: DISCONTINUED | OUTPATIENT
Start: 2022-01-01 | End: 2022-01-01 | Stop reason: HOSPADM

## 2022-01-01 RX ORDER — CARVEDILOL 25 MG/1
25 TABLET ORAL 2 TIMES DAILY WITH MEALS
Status: DISCONTINUED | OUTPATIENT
Start: 2022-01-01 | End: 2022-01-01 | Stop reason: HOSPADM

## 2022-01-01 RX ORDER — VENLAFAXINE HYDROCHLORIDE 150 MG/1
150 CAPSULE, EXTENDED RELEASE ORAL DAILY
Status: DISCONTINUED | OUTPATIENT
Start: 2022-01-01 | End: 2022-01-01 | Stop reason: HOSPADM

## 2022-01-01 RX ORDER — TRANEXAMIC ACID 10 MG/ML
1000 INJECTION, SOLUTION INTRAVENOUS ONCE
Status: CANCELLED | OUTPATIENT
Start: 2022-01-01 | End: 2022-01-01

## 2022-01-01 RX ORDER — ATORVASTATIN CALCIUM 40 MG/1
40 TABLET, FILM COATED ORAL DAILY
Qty: 30 TABLET | Refills: 0 | Status: SHIPPED | OUTPATIENT
Start: 2022-01-01 | End: 2022-01-01

## 2022-01-01 RX ORDER — HYDRALAZINE HYDROCHLORIDE 20 MG/ML
10 INJECTION INTRAMUSCULAR; INTRAVENOUS ONCE
Status: COMPLETED | OUTPATIENT
Start: 2022-01-01 | End: 2022-01-01

## 2022-01-01 RX ORDER — SODIUM CHLORIDE 0.9 % (FLUSH) 0.9 %
10 SYRINGE (ML) INJECTION EVERY 12 HOURS SCHEDULED
Status: DISCONTINUED | OUTPATIENT
Start: 2022-01-01 | End: 2022-01-01 | Stop reason: HOSPADM

## 2022-01-01 RX ORDER — CLONIDINE HYDROCHLORIDE 0.1 MG/1
0.1 TABLET ORAL ONCE
Status: COMPLETED | OUTPATIENT
Start: 2022-01-01 | End: 2022-01-01

## 2022-01-01 RX ORDER — ATORVASTATIN CALCIUM 40 MG/1
40 TABLET, FILM COATED ORAL DAILY
Status: DISCONTINUED | OUTPATIENT
Start: 2022-01-01 | End: 2022-01-01 | Stop reason: HOSPADM

## 2022-01-01 RX ORDER — LISINOPRIL 10 MG/1
10 TABLET ORAL
Status: DISCONTINUED | OUTPATIENT
Start: 2022-01-01 | End: 2022-01-01

## 2022-01-01 RX ORDER — ISOSORBIDE MONONITRATE 30 MG/1
30 TABLET, EXTENDED RELEASE ORAL DAILY
Status: DISCONTINUED | OUTPATIENT
Start: 2022-01-01 | End: 2022-01-01 | Stop reason: HOSPADM

## 2022-01-01 RX ORDER — ISOSORBIDE MONONITRATE 30 MG/1
30 TABLET, EXTENDED RELEASE ORAL DAILY
Status: ON HOLD | COMMUNITY
End: 2022-01-01 | Stop reason: SDUPTHER

## 2022-01-01 RX ORDER — SODIUM CHLORIDE 0.9 % (FLUSH) 0.9 %
10 SYRINGE (ML) INJECTION AS NEEDED
Status: DISCONTINUED | OUTPATIENT
Start: 2022-01-01 | End: 2022-01-01

## 2022-01-01 RX ORDER — TIZANIDINE 4 MG/1
4 TABLET ORAL EVERY 6 HOURS PRN
Status: DISCONTINUED | OUTPATIENT
Start: 2022-01-01 | End: 2022-01-01 | Stop reason: HOSPADM

## 2022-01-01 RX ORDER — CHOLECALCIFEROL (VITAMIN D3) 125 MCG
5 CAPSULE ORAL NIGHTLY PRN
Status: DISCONTINUED | OUTPATIENT
Start: 2022-01-01 | End: 2022-01-01 | Stop reason: HOSPADM

## 2022-01-01 RX ORDER — IPRATROPIUM BROMIDE AND ALBUTEROL SULFATE 2.5; .5 MG/3ML; MG/3ML
3 SOLUTION RESPIRATORY (INHALATION)
Status: DISCONTINUED | OUTPATIENT
Start: 2022-01-01 | End: 2022-01-01 | Stop reason: HOSPADM

## 2022-01-01 RX ORDER — ACETAMINOPHEN 325 MG/1
650 TABLET ORAL EVERY 4 HOURS PRN
Status: DISCONTINUED | OUTPATIENT
Start: 2022-01-01 | End: 2022-01-01 | Stop reason: HOSPADM

## 2022-01-01 RX ORDER — VENLAFAXINE HYDROCHLORIDE 150 MG/1
150 CAPSULE, EXTENDED RELEASE ORAL DAILY
COMMUNITY

## 2022-01-01 RX ORDER — LISINOPRIL 5 MG/1
5 TABLET ORAL
Status: DISCONTINUED | OUTPATIENT
Start: 2022-01-01 | End: 2022-01-01

## 2022-01-01 RX ORDER — NICOTINE 21 MG/24HR
1 PATCH, TRANSDERMAL 24 HOURS TRANSDERMAL
Status: DISCONTINUED | OUTPATIENT
Start: 2022-01-01 | End: 2022-01-01 | Stop reason: HOSPADM

## 2022-01-01 RX ORDER — ASPIRIN 81 MG/1
81 TABLET, CHEWABLE ORAL DAILY
Qty: 30 TABLET | Refills: 0 | Status: SHIPPED | OUTPATIENT
Start: 2022-01-01 | End: 2022-01-01

## 2022-01-01 RX ORDER — CEFAZOLIN SODIUM 2 G/100ML
2 INJECTION, SOLUTION INTRAVENOUS ONCE
Status: CANCELLED | OUTPATIENT
Start: 2022-01-01 | End: 2022-01-01

## 2022-01-01 RX ORDER — ALPRAZOLAM 0.25 MG/1
1 TABLET ORAL 4 TIMES DAILY
Status: DISCONTINUED | OUTPATIENT
Start: 2022-01-01 | End: 2022-01-01 | Stop reason: DRUGHIGH

## 2022-01-01 RX ORDER — SODIUM CHLORIDE 0.9 % (FLUSH) 0.9 %
10 SYRINGE (ML) INJECTION AS NEEDED
Status: DISCONTINUED | OUTPATIENT
Start: 2022-01-01 | End: 2022-01-01 | Stop reason: HOSPADM

## 2022-01-01 RX ORDER — ATORVASTATIN CALCIUM 40 MG/1
40 TABLET, FILM COATED ORAL DAILY
COMMUNITY
Start: 2022-01-01

## 2022-01-01 RX ORDER — NICOTINE POLACRILEX 4 MG
15 LOZENGE BUCCAL
Status: DISCONTINUED | OUTPATIENT
Start: 2022-01-01 | End: 2022-01-01 | Stop reason: HOSPADM

## 2022-01-01 RX ORDER — BISACODYL 10 MG
10 SUPPOSITORY, RECTAL RECTAL DAILY PRN
Status: DISCONTINUED | OUTPATIENT
Start: 2022-01-01 | End: 2022-01-01

## 2022-01-01 RX ORDER — ONDANSETRON 4 MG/1
4 TABLET, FILM COATED ORAL EVERY 6 HOURS PRN
Status: DISCONTINUED | OUTPATIENT
Start: 2022-01-01 | End: 2022-01-01 | Stop reason: HOSPADM

## 2022-01-01 RX ORDER — NITROGLYCERIN 0.4 MG/1
0.4 TABLET SUBLINGUAL
Status: DISCONTINUED | OUTPATIENT
Start: 2022-01-01 | End: 2022-01-01 | Stop reason: HOSPADM

## 2022-01-01 RX ORDER — HYDRALAZINE HYDROCHLORIDE 20 MG/ML
20 INJECTION INTRAMUSCULAR; INTRAVENOUS ONCE
Status: COMPLETED | OUTPATIENT
Start: 2022-01-01 | End: 2022-01-01

## 2022-01-01 RX ORDER — LOSARTAN POTASSIUM 50 MG/1
100 TABLET ORAL DAILY
Status: DISCONTINUED | OUTPATIENT
Start: 2022-01-01 | End: 2022-01-01 | Stop reason: HOSPADM

## 2022-01-01 RX ORDER — DEXTROSE MONOHYDRATE 100 MG/ML
25 INJECTION, SOLUTION INTRAVENOUS
Status: DISCONTINUED | OUTPATIENT
Start: 2022-01-01 | End: 2022-01-01 | Stop reason: HOSPADM

## 2022-01-01 RX ORDER — ONDANSETRON 2 MG/ML
4 INJECTION INTRAMUSCULAR; INTRAVENOUS EVERY 6 HOURS PRN
Status: DISCONTINUED | OUTPATIENT
Start: 2022-01-01 | End: 2022-01-01 | Stop reason: HOSPADM

## 2022-01-01 RX ORDER — BISACODYL 10 MG
10 SUPPOSITORY, RECTAL RECTAL DAILY PRN
Status: DISCONTINUED | OUTPATIENT
Start: 2022-01-01 | End: 2022-01-01 | Stop reason: HOSPADM

## 2022-01-01 RX ORDER — LEVOCETIRIZINE DIHYDROCHLORIDE 5 MG/1
5 TABLET, FILM COATED ORAL DAILY
COMMUNITY
Start: 2021-01-01

## 2022-01-01 RX ORDER — ALPRAZOLAM 1 MG/1
1 TABLET ORAL 4 TIMES DAILY PRN
Status: DISCONTINUED | OUTPATIENT
Start: 2022-01-01 | End: 2022-01-01 | Stop reason: HOSPADM

## 2022-01-01 RX ORDER — LOSARTAN POTASSIUM 100 MG/1
100 TABLET ORAL DAILY
Qty: 30 TABLET | Refills: 0 | Status: SHIPPED | OUTPATIENT
Start: 2022-01-01 | End: 2022-01-01

## 2022-01-01 RX ORDER — FUROSEMIDE 10 MG/ML
40 INJECTION INTRAMUSCULAR; INTRAVENOUS ONCE
Status: COMPLETED | OUTPATIENT
Start: 2022-01-01 | End: 2022-01-01

## 2022-01-01 RX ORDER — AMOXICILLIN 250 MG
2 CAPSULE ORAL 2 TIMES DAILY PRN
Status: DISCONTINUED | OUTPATIENT
Start: 2022-01-01 | End: 2022-01-01 | Stop reason: HOSPADM

## 2022-01-01 RX ORDER — CEFAZOLIN SODIUM IN 0.9 % NACL 3 G/100 ML
3 INTRAVENOUS SOLUTION, PIGGYBACK (ML) INTRAVENOUS ONCE
Status: CANCELLED | OUTPATIENT
Start: 2022-01-01 | End: 2022-01-01

## 2022-01-01 RX ORDER — BISACODYL 5 MG/1
5 TABLET, DELAYED RELEASE ORAL DAILY PRN
Status: DISCONTINUED | OUTPATIENT
Start: 2022-01-01 | End: 2022-01-01 | Stop reason: HOSPADM

## 2022-01-01 RX ORDER — MULTIPLE VITAMINS W/ MINERALS TAB 9MG-400MCG
1 TAB ORAL DAILY
Status: DISCONTINUED | OUTPATIENT
Start: 2022-01-01 | End: 2022-01-01 | Stop reason: HOSPADM

## 2022-01-01 RX ORDER — CITALOPRAM 20 MG/1
10 TABLET ORAL DAILY
Status: DISCONTINUED | OUTPATIENT
Start: 2022-01-01 | End: 2022-01-01 | Stop reason: HOSPADM

## 2022-01-01 RX ORDER — LABETALOL HYDROCHLORIDE 5 MG/ML
20 INJECTION, SOLUTION INTRAVENOUS ONCE
Status: COMPLETED | OUTPATIENT
Start: 2022-01-01 | End: 2022-01-01

## 2022-01-01 RX ORDER — DIAZEPAM 5 MG/ML
5 INJECTION, SOLUTION INTRAMUSCULAR; INTRAVENOUS ONCE
Status: COMPLETED | OUTPATIENT
Start: 2022-01-01 | End: 2022-01-01

## 2022-01-01 RX ORDER — BISACODYL 5 MG/1
5 TABLET, DELAYED RELEASE ORAL DAILY PRN
Status: DISCONTINUED | OUTPATIENT
Start: 2022-01-01 | End: 2022-01-01

## 2022-01-01 RX ORDER — CALCIUM CARBONATE 200(500)MG
1 TABLET,CHEWABLE ORAL 2 TIMES DAILY PRN
Status: DISCONTINUED | OUTPATIENT
Start: 2022-01-01 | End: 2022-01-01 | Stop reason: HOSPADM

## 2022-01-01 RX ORDER — BUDESONIDE AND FORMOTEROL FUMARATE DIHYDRATE 160; 4.5 UG/1; UG/1
2 AEROSOL RESPIRATORY (INHALATION)
Status: DISCONTINUED | OUTPATIENT
Start: 2022-01-01 | End: 2022-01-01 | Stop reason: HOSPADM

## 2022-01-01 RX ORDER — ATORVASTATIN CALCIUM 40 MG/1
40 TABLET, FILM COATED ORAL NIGHTLY
Status: DISCONTINUED | OUTPATIENT
Start: 2022-01-01 | End: 2022-01-01 | Stop reason: HOSPADM

## 2022-01-01 RX ORDER — POTASSIUM CHLORIDE 750 MG/1
10 CAPSULE, EXTENDED RELEASE ORAL DAILY
COMMUNITY
Start: 2022-01-01

## 2022-01-01 RX ORDER — ASPIRIN 81 MG/1
81 TABLET, CHEWABLE ORAL DAILY
Status: DISCONTINUED | OUTPATIENT
Start: 2022-01-01 | End: 2022-01-01 | Stop reason: HOSPADM

## 2022-01-01 RX ORDER — AMOXICILLIN 250 MG
2 CAPSULE ORAL 2 TIMES DAILY
Status: DISCONTINUED | OUTPATIENT
Start: 2022-01-01 | End: 2022-01-01

## 2022-01-01 RX ORDER — HYDROCODONE BITARTRATE AND ACETAMINOPHEN 5; 325 MG/1; MG/1
1 TABLET ORAL EVERY 6 HOURS PRN
Status: DISCONTINUED | OUTPATIENT
Start: 2022-01-01 | End: 2022-01-01 | Stop reason: HOSPADM

## 2022-01-01 RX ORDER — TICAGRELOR 90 MG/1
90 TABLET ORAL 2 TIMES DAILY
Qty: 60 TABLET | Refills: 0 | Status: SHIPPED | OUTPATIENT
Start: 2022-01-01 | End: 2022-01-01

## 2022-01-01 RX ORDER — HYDRALAZINE HYDROCHLORIDE 20 MG/ML
20 INJECTION INTRAMUSCULAR; INTRAVENOUS EVERY 4 HOURS PRN
Status: DISCONTINUED | OUTPATIENT
Start: 2022-01-01 | End: 2022-01-01 | Stop reason: HOSPADM

## 2022-01-01 RX ORDER — CARVEDILOL 12.5 MG/1
12.5 TABLET ORAL ONCE
Status: COMPLETED | OUTPATIENT
Start: 2022-01-01 | End: 2022-01-01

## 2022-01-01 RX ORDER — CARVEDILOL 25 MG/1
25 TABLET ORAL 2 TIMES DAILY WITH MEALS
Qty: 60 TABLET | Refills: 0 | Status: ON HOLD | OUTPATIENT
Start: 2022-01-01 | End: 2022-01-01 | Stop reason: SDUPTHER

## 2022-01-01 RX ORDER — POLYETHYLENE GLYCOL 3350 17 G/17G
17 POWDER, FOR SOLUTION ORAL DAILY PRN
Status: DISCONTINUED | OUTPATIENT
Start: 2022-01-01 | End: 2022-01-01

## 2022-01-01 RX ORDER — BUTALBITAL, ACETAMINOPHEN AND CAFFEINE 300; 40; 50 MG/1; MG/1; MG/1
2 CAPSULE ORAL EVERY 4 HOURS PRN
Status: DISCONTINUED | OUTPATIENT
Start: 2022-01-01 | End: 2022-01-01 | Stop reason: HOSPADM

## 2022-01-01 RX ORDER — MULTIPLE VITAMINS W/ MINERALS TAB 9MG-400MCG
1 TAB ORAL DAILY
COMMUNITY

## 2022-01-01 RX ORDER — AMLODIPINE BESYLATE 5 MG/1
10 TABLET ORAL
Status: DISCONTINUED | OUTPATIENT
Start: 2022-01-01 | End: 2022-01-01 | Stop reason: HOSPADM

## 2022-01-01 RX ORDER — CARVEDILOL 25 MG/1
25 TABLET ORAL 2 TIMES DAILY WITH MEALS
Qty: 60 TABLET | Refills: 0 | Status: SHIPPED | OUTPATIENT
Start: 2022-01-01 | End: 2022-01-01

## 2022-01-01 RX ORDER — LORAZEPAM 2 MG/ML
1 INJECTION INTRAMUSCULAR ONCE
Status: COMPLETED | OUTPATIENT
Start: 2022-01-01 | End: 2022-01-01

## 2022-01-01 RX ORDER — PIOGLITAZONEHYDROCHLORIDE 30 MG/1
30 TABLET ORAL DAILY
COMMUNITY

## 2022-01-01 RX ORDER — ISOSORBIDE MONONITRATE 30 MG/1
30 TABLET, EXTENDED RELEASE ORAL DAILY
Qty: 30 TABLET | Refills: 0 | Status: SHIPPED | OUTPATIENT
Start: 2022-01-01 | End: 2022-01-01

## 2022-01-01 RX ORDER — CARVEDILOL 12.5 MG/1
12.5 TABLET ORAL 2 TIMES DAILY WITH MEALS
Status: DISCONTINUED | OUTPATIENT
Start: 2022-01-01 | End: 2022-01-01

## 2022-01-01 RX ORDER — AMLODIPINE BESYLATE 10 MG/1
10 TABLET ORAL
Qty: 30 TABLET | Refills: 0 | Status: SHIPPED | OUTPATIENT
Start: 2022-01-01 | End: 2022-01-01

## 2022-01-01 RX ORDER — ALPRAZOLAM 0.25 MG/1
1 TABLET ORAL 4 TIMES DAILY
Status: DISCONTINUED | OUTPATIENT
Start: 2022-01-01 | End: 2022-01-01

## 2022-01-01 RX ORDER — CALCIUM CARBONATE 200(500)MG
2 TABLET,CHEWABLE ORAL 2 TIMES DAILY PRN
Status: DISCONTINUED | OUTPATIENT
Start: 2022-01-01 | End: 2022-01-01 | Stop reason: HOSPADM

## 2022-01-01 RX ADMIN — ASPIRIN 81 MG CHEWABLE TABLET 81 MG: 81 TABLET CHEWABLE at 08:23

## 2022-01-01 RX ADMIN — ASPIRIN 81 MG CHEWABLE TABLET 81 MG: 81 TABLET CHEWABLE at 08:30

## 2022-01-01 RX ADMIN — HYDROCODONE BITARTRATE AND ACETAMINOPHEN 1 TABLET: 5; 325 TABLET ORAL at 20:10

## 2022-01-01 RX ADMIN — HYDRALAZINE HYDROCHLORIDE 10 MG: 20 INJECTION INTRAMUSCULAR; INTRAVENOUS at 18:51

## 2022-01-01 RX ADMIN — CARVEDILOL 25 MG: 25 TABLET, FILM COATED ORAL at 17:17

## 2022-01-01 RX ADMIN — VENLAFAXINE HYDROCHLORIDE 150 MG: 150 CAPSULE, EXTENDED RELEASE ORAL at 15:15

## 2022-01-01 RX ADMIN — ALPRAZOLAM 1 MG: 1 TABLET ORAL at 22:50

## 2022-01-01 RX ADMIN — HYDROCODONE BITARTRATE AND ACETAMINOPHEN 1 TABLET: 5; 325 TABLET ORAL at 07:45

## 2022-01-01 RX ADMIN — ENOXAPARIN SODIUM 40 MG: 100 INJECTION SUBCUTANEOUS at 09:29

## 2022-01-01 RX ADMIN — SODIUM CHLORIDE, PRESERVATIVE FREE 10 ML: 5 INJECTION INTRAVENOUS at 08:30

## 2022-01-01 RX ADMIN — ATORVASTATIN CALCIUM 40 MG: 40 TABLET, FILM COATED ORAL at 20:48

## 2022-01-01 RX ADMIN — AMLODIPINE BESYLATE 10 MG: 5 TABLET ORAL at 08:29

## 2022-01-01 RX ADMIN — ATORVASTATIN CALCIUM 40 MG: 40 TABLET, FILM COATED ORAL at 03:11

## 2022-01-01 RX ADMIN — BUDESONIDE AND FORMOTEROL FUMARATE DIHYDRATE 2 PUFF: 160; 4.5 AEROSOL RESPIRATORY (INHALATION) at 06:30

## 2022-01-01 RX ADMIN — HYDRALAZINE HYDROCHLORIDE 20 MG: 20 INJECTION INTRAMUSCULAR; INTRAVENOUS at 21:50

## 2022-01-01 RX ADMIN — TICAGRELOR 90 MG: 90 TABLET ORAL at 20:09

## 2022-01-01 RX ADMIN — CARVEDILOL 25 MG: 25 TABLET, FILM COATED ORAL at 08:29

## 2022-01-01 RX ADMIN — IPRATROPIUM BROMIDE AND ALBUTEROL SULFATE 3 ML: 2.5; .5 SOLUTION RESPIRATORY (INHALATION) at 18:16

## 2022-01-01 RX ADMIN — LOSARTAN POTASSIUM 100 MG: 50 TABLET, FILM COATED ORAL at 08:24

## 2022-01-01 RX ADMIN — CARVEDILOL 25 MG: 25 TABLET, FILM COATED ORAL at 15:15

## 2022-01-01 RX ADMIN — CLONIDINE HYDROCHLORIDE 0.1 MG: 0.1 TABLET ORAL at 22:40

## 2022-01-01 RX ADMIN — VENLAFAXINE HYDROCHLORIDE 150 MG: 150 CAPSULE, EXTENDED RELEASE ORAL at 08:30

## 2022-01-01 RX ADMIN — TICAGRELOR 90 MG: 90 TABLET ORAL at 08:30

## 2022-01-01 RX ADMIN — FUROSEMIDE 40 MG: 10 INJECTION, SOLUTION INTRAMUSCULAR; INTRAVENOUS at 18:51

## 2022-01-01 RX ADMIN — LOSARTAN POTASSIUM 100 MG: 50 TABLET, FILM COATED ORAL at 03:41

## 2022-01-01 RX ADMIN — LABETALOL 20 MG/4 ML (5 MG/ML) INTRAVENOUS SYRINGE 20 MG: at 22:38

## 2022-01-01 RX ADMIN — INSULIN DETEMIR 20 UNITS: 100 INJECTION, SOLUTION SUBCUTANEOUS at 08:24

## 2022-01-01 RX ADMIN — ATORVASTATIN CALCIUM 40 MG: 40 TABLET, FILM COATED ORAL at 08:29

## 2022-01-01 RX ADMIN — CLONIDINE HYDROCHLORIDE 0.1 MG: 0.1 TABLET ORAL at 07:32

## 2022-01-01 RX ADMIN — ASPIRIN 81 MG CHEWABLE TABLET 81 MG: 81 TABLET CHEWABLE at 09:25

## 2022-01-01 RX ADMIN — ACETAMINOPHEN 650 MG: 325 TABLET ORAL at 22:50

## 2022-01-01 RX ADMIN — ENOXAPARIN SODIUM 40 MG: 100 INJECTION SUBCUTANEOUS at 15:01

## 2022-01-01 RX ADMIN — IPRATROPIUM BROMIDE AND ALBUTEROL SULFATE 3 ML: 2.5; .5 SOLUTION RESPIRATORY (INHALATION) at 11:23

## 2022-01-01 RX ADMIN — ALPRAZOLAM 1 MG: 1 TABLET ORAL at 11:04

## 2022-01-01 RX ADMIN — CARVEDILOL 25 MG: 25 TABLET, FILM COATED ORAL at 07:45

## 2022-01-01 RX ADMIN — TIZANIDINE 4 MG: 4 TABLET ORAL at 21:36

## 2022-01-01 RX ADMIN — LOSARTAN POTASSIUM 100 MG: 50 TABLET, FILM COATED ORAL at 15:15

## 2022-01-01 RX ADMIN — IPRATROPIUM BROMIDE AND ALBUTEROL SULFATE 3 ML: 2.5; .5 SOLUTION RESPIRATORY (INHALATION) at 20:38

## 2022-01-01 RX ADMIN — TICAGRELOR 90 MG: 90 TABLET ORAL at 20:48

## 2022-01-01 RX ADMIN — SODIUM CHLORIDE, PRESERVATIVE FREE 10 ML: 5 INJECTION INTRAVENOUS at 08:54

## 2022-01-01 RX ADMIN — ALPRAZOLAM 1 MG: 1 TABLET ORAL at 21:36

## 2022-01-01 RX ADMIN — TIZANIDINE 4 MG: 4 TABLET ORAL at 01:28

## 2022-01-01 RX ADMIN — HYDROCODONE BITARTRATE AND ACETAMINOPHEN 1 TABLET: 5; 325 TABLET ORAL at 08:30

## 2022-01-01 RX ADMIN — SENNOSIDES AND DOCUSATE SODIUM 2 TABLET: 50; 8.6 TABLET ORAL at 08:53

## 2022-01-01 RX ADMIN — ISOSORBIDE MONONITRATE 30 MG: 30 TABLET, EXTENDED RELEASE ORAL at 09:25

## 2022-01-01 RX ADMIN — VENLAFAXINE HYDROCHLORIDE 150 MG: 150 CAPSULE, EXTENDED RELEASE ORAL at 09:26

## 2022-01-01 RX ADMIN — ISOSORBIDE MONONITRATE 30 MG: 30 TABLET ORAL at 08:30

## 2022-01-01 RX ADMIN — ACETAMINOPHEN 650 MG: 325 TABLET ORAL at 23:20

## 2022-01-01 RX ADMIN — SODIUM CHLORIDE, PRESERVATIVE FREE 10 ML: 5 INJECTION INTRAVENOUS at 11:58

## 2022-01-01 RX ADMIN — LABETALOL 20 MG/4 ML (5 MG/ML) INTRAVENOUS SYRINGE 20 MG: at 09:50

## 2022-01-01 RX ADMIN — METOPROLOL TARTRATE 5 MG: 5 INJECTION INTRAVENOUS at 03:23

## 2022-01-01 RX ADMIN — HYDRALAZINE HYDROCHLORIDE 20 MG: 20 INJECTION INTRAMUSCULAR; INTRAVENOUS at 07:32

## 2022-01-01 RX ADMIN — ALPRAZOLAM 1 MG: 1 TABLET ORAL at 03:11

## 2022-01-01 RX ADMIN — ENOXAPARIN SODIUM 40 MG: 100 INJECTION SUBCUTANEOUS at 14:27

## 2022-01-01 RX ADMIN — BUDESONIDE AND FORMOTEROL FUMARATE DIHYDRATE 2 PUFF: 160; 4.5 AEROSOL RESPIRATORY (INHALATION) at 20:38

## 2022-01-01 RX ADMIN — ALPRAZOLAM 1 MG: 1 TABLET ORAL at 20:10

## 2022-01-01 RX ADMIN — NICOTINE 1 PATCH: 21 PATCH, EXTENDED RELEASE TRANSDERMAL at 09:29

## 2022-01-01 RX ADMIN — BUTALBITAL, ACETAMINOPHEN AND CAFFEINE 2 CAPSULE: 300; 40; 50 CAPSULE ORAL at 15:14

## 2022-01-01 RX ADMIN — TICAGRELOR 90 MG: 90 TABLET ORAL at 22:50

## 2022-01-01 RX ADMIN — TICAGRELOR 90 MG: 90 TABLET ORAL at 08:29

## 2022-01-01 RX ADMIN — INSULIN LISPRO 2 UNITS: 100 INJECTION, SOLUTION INTRAVENOUS; SUBCUTANEOUS at 11:30

## 2022-01-01 RX ADMIN — VENLAFAXINE HYDROCHLORIDE 150 MG: 150 CAPSULE, EXTENDED RELEASE ORAL at 08:24

## 2022-01-01 RX ADMIN — LOSARTAN POTASSIUM 100 MG: 50 TABLET, FILM COATED ORAL at 09:26

## 2022-01-01 RX ADMIN — LORAZEPAM 1 MG: 2 INJECTION INTRAMUSCULAR; INTRAVENOUS at 07:33

## 2022-01-01 RX ADMIN — VENLAFAXINE HYDROCHLORIDE 150 MG: 150 CAPSULE, EXTENDED RELEASE ORAL at 08:29

## 2022-01-01 RX ADMIN — ASPIRIN 81 MG CHEWABLE TABLET 81 MG: 81 TABLET CHEWABLE at 08:53

## 2022-01-01 RX ADMIN — CARVEDILOL 25 MG: 25 TABLET, FILM COATED ORAL at 09:25

## 2022-01-01 RX ADMIN — SODIUM CHLORIDE, PRESERVATIVE FREE 10 ML: 5 INJECTION INTRAVENOUS at 21:35

## 2022-01-01 RX ADMIN — NICOTINE 1 PATCH: 21 PATCH, EXTENDED RELEASE TRANSDERMAL at 08:32

## 2022-01-01 RX ADMIN — IPRATROPIUM BROMIDE AND ALBUTEROL SULFATE 3 ML: 2.5; .5 SOLUTION RESPIRATORY (INHALATION) at 14:52

## 2022-01-01 RX ADMIN — ASPIRIN 81 MG CHEWABLE TABLET 81 MG: 81 TABLET CHEWABLE at 08:29

## 2022-01-01 RX ADMIN — TICAGRELOR 90 MG: 90 TABLET ORAL at 09:26

## 2022-01-01 RX ADMIN — LOSARTAN POTASSIUM 100 MG: 50 TABLET, FILM COATED ORAL at 08:29

## 2022-01-01 RX ADMIN — TICAGRELOR 90 MG: 90 TABLET ORAL at 21:35

## 2022-01-01 RX ADMIN — VENLAFAXINE HYDROCHLORIDE 150 MG: 150 CAPSULE, EXTENDED RELEASE ORAL at 13:07

## 2022-01-01 RX ADMIN — ENOXAPARIN SODIUM 40 MG: 100 INJECTION SUBCUTANEOUS at 17:27

## 2022-01-01 RX ADMIN — CARVEDILOL 25 MG: 25 TABLET, FILM COATED ORAL at 17:12

## 2022-01-01 RX ADMIN — SODIUM CHLORIDE, PRESERVATIVE FREE 10 ML: 5 INJECTION INTRAVENOUS at 20:51

## 2022-01-01 RX ADMIN — NICOTINE 1 PATCH: 21 PATCH, EXTENDED RELEASE TRANSDERMAL at 08:30

## 2022-01-01 RX ADMIN — Medication 1 TABLET: at 09:25

## 2022-01-01 RX ADMIN — ISOSORBIDE MONONITRATE 30 MG: 30 TABLET ORAL at 15:15

## 2022-01-01 RX ADMIN — ALPRAZOLAM 1 MG: 1 TABLET ORAL at 20:48

## 2022-01-01 RX ADMIN — IPRATROPIUM BROMIDE AND ALBUTEROL SULFATE 3 ML: 2.5; .5 SOLUTION RESPIRATORY (INHALATION) at 18:14

## 2022-01-01 RX ADMIN — CARVEDILOL 25 MG: 25 TABLET, FILM COATED ORAL at 08:24

## 2022-01-01 RX ADMIN — Medication 1 TABLET: at 08:53

## 2022-01-01 RX ADMIN — NICOTINE 1 PATCH: 21 PATCH, EXTENDED RELEASE TRANSDERMAL at 11:30

## 2022-01-01 RX ADMIN — ENOXAPARIN SODIUM 40 MG: 100 INJECTION SUBCUTANEOUS at 08:53

## 2022-01-01 RX ADMIN — CARVEDILOL 25 MG: 25 TABLET, FILM COATED ORAL at 17:38

## 2022-01-01 RX ADMIN — Medication 5 MG: at 01:28

## 2022-01-01 RX ADMIN — SODIUM CHLORIDE, PRESERVATIVE FREE 10 ML: 5 INJECTION INTRAVENOUS at 22:49

## 2022-01-01 RX ADMIN — TICAGRELOR 90 MG: 90 TABLET ORAL at 08:53

## 2022-01-01 RX ADMIN — ATORVASTATIN CALCIUM 40 MG: 40 TABLET, FILM COATED ORAL at 08:24

## 2022-01-01 RX ADMIN — SODIUM CHLORIDE, PRESERVATIVE FREE 10 ML: 5 INJECTION INTRAVENOUS at 08:24

## 2022-01-01 RX ADMIN — SODIUM CHLORIDE, PRESERVATIVE FREE 10 ML: 5 INJECTION INTRAVENOUS at 20:11

## 2022-01-01 RX ADMIN — ISOSORBIDE MONONITRATE 30 MG: 30 TABLET ORAL at 08:29

## 2022-01-01 RX ADMIN — LOSARTAN POTASSIUM 100 MG: 50 TABLET, FILM COATED ORAL at 08:30

## 2022-01-01 RX ADMIN — CARVEDILOL 12.5 MG: 12.5 TABLET, FILM COATED ORAL at 14:32

## 2022-01-01 RX ADMIN — CITALOPRAM HYDROBROMIDE 10 MG: 20 TABLET ORAL at 13:07

## 2022-01-01 RX ADMIN — TICAGRELOR 90 MG: 90 TABLET ORAL at 10:51

## 2022-01-01 RX ADMIN — IPRATROPIUM BROMIDE AND ALBUTEROL SULFATE 3 ML: 2.5; .5 SOLUTION RESPIRATORY (INHALATION) at 06:25

## 2022-01-01 RX ADMIN — ISOSORBIDE MONONITRATE 30 MG: 30 TABLET ORAL at 08:24

## 2022-01-01 RX ADMIN — CARVEDILOL 12.5 MG: 12.5 TABLET, FILM COATED ORAL at 03:41

## 2022-01-01 RX ADMIN — SODIUM CHLORIDE, PRESERVATIVE FREE 10 ML: 5 INJECTION INTRAVENOUS at 08:32

## 2022-01-01 RX ADMIN — ACETAMINOPHEN 650 MG: 325 TABLET ORAL at 14:17

## 2022-01-01 RX ADMIN — NICOTINE 1 PATCH: 21 PATCH, EXTENDED RELEASE TRANSDERMAL at 15:13

## 2022-01-01 RX ADMIN — DIAZEPAM 5 MG: 5 INJECTION, SOLUTION INTRAMUSCULAR; INTRAVENOUS at 22:37

## 2022-01-01 RX ADMIN — CITALOPRAM HYDROBROMIDE 10 MG: 20 TABLET ORAL at 09:25

## 2022-01-01 RX ADMIN — AMLODIPINE BESYLATE 10 MG: 5 TABLET ORAL at 08:48

## 2022-02-05 PROBLEM — I10 ESSENTIAL HYPERTENSION: Status: ACTIVE | Noted: 2022-01-01

## 2022-02-05 PROBLEM — J44.9 COPD (CHRONIC OBSTRUCTIVE PULMONARY DISEASE) (HCC): Status: ACTIVE | Noted: 2022-01-01

## 2022-02-05 PROBLEM — R51.9 HEADACHE: Status: ACTIVE | Noted: 2022-01-01

## 2022-02-05 PROBLEM — E11.9 TYPE 2 DIABETES MELLITUS, WITHOUT LONG-TERM CURRENT USE OF INSULIN: Status: ACTIVE | Noted: 2022-01-01

## 2022-02-05 PROBLEM — I50.9 CONGESTIVE HEART FAILURE (CHF): Status: ACTIVE | Noted: 2022-01-01

## 2022-02-05 PROBLEM — I73.9 PERIPHERAL VASCULAR DISEASE (HCC): Status: ACTIVE | Noted: 2022-01-01

## 2022-02-05 PROBLEM — I16.0 HYPERTENSIVE URGENCY: Status: ACTIVE | Noted: 2022-01-01

## 2022-02-05 NOTE — H&P
[facility]   HOSPITALIST HISTORY AND PHYSICAL  Date: 2022   Patient Name: Betty Sprague  : 1955  MRN: [id number]  Primary Care Physician:  Maico Young MD  Date of admission: 2022    Subjective   Subjective     Chief Complaint: Headache x1 day    HPI:    Betty Sprague is a 66 y.o. female with medical history significant for CHF, COPD, type 2 diabetes, essential hypertension, hyperlipidemia; presents with headache x1 day.  Patient reports that she started to experience significant headache that prompted her to call EMS.  Patient states she felt that her blood pressure may be elevated which prompted her to want to go to the ER.  In the ER, patient found to have a blood pressure of 220/78.  In the ER, patient was given clonidine 0.1 mg, Valium 5 mg, hydralazine 20 mg, labetalol 20 mg.  Patient was still noted to have a systolic blood pressure in the 200s and hospitalist service called for admission.  Patient reports that she does not care for her current pharmacy.  Patient states that she feels that they get things mixed up.  Patient notes that she is currently on losartan 100 mg, Coreg 12.5 mg and Imdur 30 mg for her blood pressure.      Personal History     Past Medical History:  Past Medical History:   Diagnosis Date   • Arthritis    • Benign essential hypertension    • Cervicalgia 2018   • CHF (congestive heart failure) ([facility])    • COPD (chronic obstructive pulmonary disease) ([facility])    • DDD (degenerative disc disease), lumbar    • Dental decay 2017   • Diabetes ([facility])    • Essential hypertension 2017   • Heart attack ([facility])    • Hip fracture ([facility])    • HLD (hyperlipidemia) 2017    will update her lipids   • HTN (hypertension)    • Hyperlipemia    • Leg pain    • Leg swelling    • Leg weakness, bilateral 2018   • Lumbago 2018    with low back pain   • Lumbar back pain    • Lumbar herniated disc    • Mild episode of recurrent major depressive disorder (HCC)  2017   • Muscle cramps    • Seasonal allergies    • Spinal stenosis of lumbar region 2017   • Spinal stenosis, lumbosacral region 2018   • Tobacco abuse 2017   • Tobacco abuse counseling 2017   • Vascular disease, peripheral (HCC)         Past Surgical History:  Past Surgical History:   Procedure Laterality Date   • CARDIAC SURGERY     •  SECTION     • CORONARY ANGIOPLASTY WITH STENT PLACEMENT     • TONSILLECTOMY          Family History:   Family History   Problem Relation Age of Onset   • Heart disease Father    • Heart disease Brother    • Cancer Brother         unspecified   • Arthritis Brother         Social History:   Social History     Socioeconomic History   • Marital status:    Tobacco Use   • Smoking status: Current Every Day Smoker     Packs/day: 0.50     Types: Cigarettes   • Smokeless tobacco: Never Used   Vaping Use   • Vaping Use: Never used   Substance and Sexual Activity   • Alcohol use: Never   • Drug use: Never        Home Medications:  Prior to Admission medications    Medication Sig Start Date End Date Taking? Authorizing Provider   ALPRAZolam (XANAX) 1 MG tablet Take 1 mg by mouth 4 (Four) Times a Day. 21  Yes ProviderAng MD   aspirin 81 MG chewable tablet Chew 81 mg Daily. Chew and swallow. 21  Yes Provider, MD Ang   atorvastatin (LIPITOR) 40 MG tablet Take 40 mg by mouth Daily. 21  Yes ProviderAng MD   Brilinta 90 MG tablet tablet Take 90 mg by mouth 2 (Two) Times a Day. 21  Yes ProviderAng MD   carvedilol (COREG) 12.5 MG tablet  6/10/21  Yes ProviderAng MD   citalopram (CeleXA) 10 MG tablet citalopram 10 mg oral tablet take 1 tablet (10 mg) by oral route once daily   Suspended   Yes Provider, MD Ang   ezetimibe (ZETIA) 10 MG tablet TAKE 1 TABLET BY MOUTH EVERY DAY 21  Yes Selena Lynn APRN   isosorbide mononitrate (IMDUR) 30 MG 24 hr tablet Take 30 mg by mouth  Daily.   Yes Ang Mercado MD   Levemir FlexTouch 100 UNIT/ML injection INJECT 25 UNITS UNDER THE SKIN TWICE DAILY 5/18/21  Yes Ang Mercado MD   lisinopril (PRINIVIL,ZESTRIL) 5 MG tablet lisinopril 5 mg oral tablet take 1 tablet (5 mg) by oral route once daily   Active   Yes Ang Mercado MD   losartan (COZAAR) 100 MG tablet Take 100 mg by mouth Daily. 6/3/21  Yes Ang Mercado MD   multivitamin with minerals tablet tablet Take 1 tablet by mouth Daily.   Yes Ang Mercado MD   Ozempic, 0.25 or 0.5 MG/DOSE, 2 MG/1.5ML solution pen-injector INJECT 0.5MG UNDER THE SKIN EVERY WEEK 5/17/21  Yes Ang Mercado MD   pioglitazone (ACTOS) 30 MG tablet Take 30 mg by mouth Daily.   Yes Ang Mercado MD   TiZANidine (ZANAFLEX) 4 MG capsule TAKE ONE CAPSULE BY MOUTH FOUR TIMES DAILY AS NEEDED 4/8/21  Yes Ang Mercado MD   venlafaxine XR (EFFEXOR-XR) 150 MG 24 hr capsule Take 150 mg by mouth Daily.   Yes Ang Mercado MD        Allergies:  Allergies   Allergen Reactions   • Latex Hives       Review of Systems  Review of Systems   Constitutional: Negative for activity change, appetite change, chills, fatigue and fever.   HENT: Negative for congestion, ear discharge, rhinorrhea, sinus pressure, sinus pain, sneezing and sore throat.    Eyes: Positive for itching.   Respiratory: Negative for cough, shortness of breath and wheezing.    Cardiovascular: Positive for leg swelling. Negative for chest pain.   Gastrointestinal: Negative for abdominal pain, constipation, diarrhea, nausea and vomiting.   Endocrine: Negative for polydipsia.   Genitourinary: Negative for decreased urine volume, difficulty urinating, dysuria, flank pain, frequency, hematuria and urgency.   Musculoskeletal: Positive for arthralgias, back pain, myalgias and neck pain.   Skin: Negative for color change and pallor.   Neurological: Positive for dizziness and headaches.   Psychiatric/Behavioral:  Negative for agitation and confusion.           Objective   Objective     Vitals:   Temp:  [98.6 °F (37 °C)] 98.6 °F (37 °C)  Heart Rate:  [] 79  Resp:  [20] 20  BP: (151-243)/() 151/65    Physical Exam   Physical Exam  Constitutional:       Appearance: Normal appearance.   HENT:      Head: Normocephalic and atraumatic.      Right Ear: External ear normal.      Left Ear: External ear normal.      Nose: Nose normal.      Mouth/Throat:      Pharynx: Oropharynx is clear.   Eyes:      Extraocular Movements: Extraocular movements intact.      Pupils: Pupils are equal, round, and reactive to light.   Cardiovascular:      Rate and Rhythm: Normal rate and regular rhythm.      Pulses: Normal pulses.      Heart sounds: Normal heart sounds. No murmur heard.  No friction rub. No gallop.    Pulmonary:      Effort: Pulmonary effort is normal. No respiratory distress.      Breath sounds: Normal breath sounds. No wheezing, rhonchi or rales.   Abdominal:      General: Bowel sounds are normal. There is no distension.      Tenderness: There is no abdominal tenderness.   Musculoskeletal:         General: No swelling. Normal range of motion.      Cervical back: Normal range of motion and neck supple.   Skin:     General: Skin is warm.      Capillary Refill: Capillary refill takes less than 2 seconds.   Neurological:      General: No focal deficit present.      Mental Status: She is alert.          Result Review    Result Review:  Glucose   Date Value Ref Range Status   02/04/2022 141 (H) 65 - 99 mg/dL Final     BUN   Date Value Ref Range Status   02/04/2022 20 8 - 23 mg/dL Final     Creatinine   Date Value Ref Range Status   02/04/2022 1.00 0.57 - 1.00 mg/dL Final     Sodium   Date Value Ref Range Status   02/04/2022 142 136 - 145 mmol/L Final     Potassium   Date Value Ref Range Status   02/04/2022 4.2 3.5 - 5.2 mmol/L Final     Chloride   Date Value Ref Range Status   02/04/2022 102 98 - 107 mmol/L Final     CO2   Date  Value Ref Range Status   02/04/2022 30.7 (H) 22.0 - 29.0 mmol/L Final     Calcium   Date Value Ref Range Status   02/04/2022 9.6 8.6 - 10.5 mg/dL Final     Total Protein   Date Value Ref Range Status   02/04/2022 6.8 6.0 - 8.5 g/dL Final     Albumin   Date Value Ref Range Status   02/04/2022 4.00 3.50 - 5.20 g/dL Final     ALT (SGPT)   Date Value Ref Range Status   02/04/2022 11 1 - 33 U/L Final     AST (SGOT)   Date Value Ref Range Status   02/04/2022 15 1 - 32 U/L Final     Alkaline Phosphatase   Date Value Ref Range Status   02/04/2022 75 39 - 117 U/L Final     Total Bilirubin   Date Value Ref Range Status   02/04/2022 0.5 0.0 - 1.2 mg/dL Final     eGFR Non  Amer   Date Value Ref Range Status   02/04/2022 55 (L) >60 mL/min/1.73 Final     BUN/Creatinine Ratio   Date Value Ref Range Status   02/04/2022 20.0 7.0 - 25.0 Final     Anion Gap   Date Value Ref Range Status   02/04/2022 9.3 5.0 - 15.0 mmol/L Final      WBC   Date Value Ref Range Status   02/04/2022 8.22 3.40 - 10.80 10*3/mm3 Final     RBC   Date Value Ref Range Status   02/04/2022 3.86 3.77 - 5.28 10*6/mm3 Final     Hemoglobin   Date Value Ref Range Status   02/04/2022 11.6 (L) 12.0 - 15.9 g/dL Final     Hematocrit   Date Value Ref Range Status   02/04/2022 35.1 34.0 - 46.6 % Final     MCV   Date Value Ref Range Status   02/04/2022 90.9 79.0 - 97.0 fL Final     MCH   Date Value Ref Range Status   02/04/2022 30.1 26.6 - 33.0 pg Final     MCHC   Date Value Ref Range Status   02/04/2022 33.0 31.5 - 35.7 g/dL Final     RDW   Date Value Ref Range Status   02/04/2022 13.6 12.3 - 15.4 % Final     RDW-SD   Date Value Ref Range Status   02/04/2022 44.2 37.0 - 54.0 fl Final     MPV   Date Value Ref Range Status   02/04/2022 9.1 6.0 - 12.0 fL Final     Platelets   Date Value Ref Range Status   02/04/2022 194 140 - 450 10*3/mm3 Final     Neutrophil %   Date Value Ref Range Status   02/04/2022 75.2 42.7 - 76.0 % Final     Lymphocyte %   Date Value Ref Range  Status   02/04/2022 14.8 (L) 19.6 - 45.3 % Final     Monocyte %   Date Value Ref Range Status   02/04/2022 6.7 5.0 - 12.0 % Final     Eosinophil %   Date Value Ref Range Status   02/04/2022 2.6 0.3 - 6.2 % Final     Basophil %   Date Value Ref Range Status   02/04/2022 0.5 0.0 - 1.5 % Final     Immature Grans %   Date Value Ref Range Status   02/04/2022 0.2 0.0 - 0.5 % Final     Neutrophils, Absolute   Date Value Ref Range Status   02/04/2022 6.18 1.70 - 7.00 10*3/mm3 Final     Lymphocytes, Absolute   Date Value Ref Range Status   02/04/2022 1.22 0.70 - 3.10 10*3/mm3 Final     Monocytes, Absolute   Date Value Ref Range Status   02/04/2022 0.55 0.10 - 0.90 10*3/mm3 Final     Eosinophils, Absolute   Date Value Ref Range Status   02/04/2022 0.21 0.00 - 0.40 10*3/mm3 Final     Basophils, Absolute   Date Value Ref Range Status   02/04/2022 0.04 0.00 - 0.20 10*3/mm3 Final     Immature Grans, Absolute   Date Value Ref Range Status   02/04/2022 0.02 0.00 - 0.05 10*3/mm3 Final     nRBC   Date Value Ref Range Status   02/04/2022 0.0 0.0 - 0.2 /100 WBC Final           Imaging:  No results found.       Assessment/Plan   Assessment / Plan     Active Hospital Problems    Diagnosis  POA   • Hypertensive urgency [I16.0]  Yes     Priority: High   • Headache [R51.9]  Yes     Priority: High   • Essential hypertension [I10]  Yes     Priority: Medium   • COPD (chronic obstructive pulmonary disease) ([facility]) [J44.9]  Unknown     Priority: Low   • Congestive heart failure (CHF) ([facility]) [I50.9]  Yes     Priority: Low   • Type 2 diabetes mellitus, without long-term current use of insulin ([facility]) [E11.9]  Yes     Priority: Low   • Peripheral vascular disease ([facility]) [I73.9]  Yes     Priority: Low   • Coronary artery disease involving native coronary artery of native heart without angina pectoris [I25.10]  Yes     Priority: Low   • Hyperlipemia, mixed [E78.2]  Yes     Priority: Low        Assessment/Plan:   1. Headache-patient presents with headache.   Patient felt that her blood pressure was uncontrolled which is what prompted her to come to the ER.  Patient's blood pressure noted to be better controlled during my interview.  Patient states that she still continues to have a headache.  Patient notes that she does drink coffee on a regular basis.  Patient will be given Fioricet for continued headache.  We will also ensure that patient's blood pressure is well controlled.  2. Hypertensive urgency-patient noted to have a systolic blood pressure in the 200s on presentation to the ED.  Patient was given several blood pressure medications in the ED with no success at bring down patient's blood pressure.  Patient reports that she does take her medications as prescribed.  On review of patient's medication box, it appears that patient is currently on Imdur 30 mg, losartan 100 mg, Coreg 12.5 mg.  Patient has several other blood pressure medications listed on her home medication list, but states that she is not currently on those meds.  Pharmacy will be calling patient's home pharmacy in order to verify her medications.  Patient's blood pressure noted to improve by midnight.  This is most likely secondary to what was given in the ED.  Patient will be resumed on all her home blood pressure medications.  3. Essential hypertension-as noted above, patient is currently on Imdur, losartan and Coreg.  Patient will be resumed on her home blood pressure medications during hospitalization.  Blood pressure will be monitored.  4. COPD-patient with history of COPD.  Patient does not appear to be in COPD exacerbation at this time.  Patient be continued on her COPD medications.  5. Congestive heart failure-patient with history of congestive heart failure.  Patient does not appear to be in heart failure at this time.  Strict I's and O's.  Daily weights.  6. Type 2 diabetes-patient with history of type 2 diabetes.  Patient will be placed on insulin sliding scale.  Accu-Cheks as  scheduled.  7. Peripheral vascular disease-patient reports that she has had recent stents placed in the past 2 to 3 months.  Patient states that she notes that she is having increasing pain in her legs when walking.  Patient will be continued on her Brilinta.  8. Coronary artery disease-patient with history of CAD.  Patient denies any chest pain at this time.  Patient will be continued on Brilinta, Coreg, Lipitor and aspirin.  9. Hyperlipidemia-patient be continued on her home Lipitor.      DVT prophylaxis:  Medical DVT prophylaxis orders are present.    CODE STATUS:    Level Of Support Discussed With: Patient  Code Status (Patient has no pulse and is not breathing): No CPR (Do Not Attempt to Resuscitate)  Medical Interventions (Patient has pulse or is breathing): Full Support      Admission Status:  I believe this patient meets observation status.    Electronically signed by Rachel Michel MD, 02/05/22, 12:39 AM EST.

## 2022-02-05 NOTE — CASE MANAGEMENT/SOCIAL WORK
Discharge Planning Assessment   [place]     Patient Name: Betty Sprague  MRN: [id number]  Today's Date: 2/5/2022    Admit Date: 2/4/2022     Discharge Needs Assessment     Row Name 02/05/22 1050       Living Environment    Lives With alone    Current Living Arrangements home/apartment/condo    Duration at Residence 3 years    Primary Care Provided by self    Provides Primary Care For no one    Family Caregiver if Needed child(nida), adult    Family Caregiver Names Pt has friends and children, her children work but she usually takes care of herself.    Quality of Family Relationships supportive       Resource/Environmental Concerns    Resource/Environmental Concerns financial    Financial Concerns food, unable to afford    Transportation Concerns car, none       Transition Planning    Patient/Family Anticipates Transition to home    Patient/Family Anticipated Services at Transition none    Transportation Anticipated car, drives self       Discharge Needs Assessment    Readmission Within the Last 30 Days no previous admission in last 30 days    Equipment Currently Used at Home cane, straight; walker, rolling    Anticipated Changes Related to Illness none    Equipment Needed After Discharge none    Discharge Coordination/Progress Pt has PCP, Pt has been vaccinated for Covid.  Pt lives alone in an upstairs apartment.  Pt states she has family and friends that can help if needed but she usually takes care of herself.  She states friends in the apartment complex will bring her bread and milk if she needs it. SW will continue to follow discharge needs.  Pt states she may need HHC at discharge.               Discharge Plan     Row Name 02/05/22 2665       Plan    Plan Pt has PCP, Pt has been vaccinated for Covid.  Pt lives alone in an upstairs apartment.  Pt states she has family and friends that can help if needed but she usually takes care of herself.  She states friends in the apartment complex will bring her bread and  milk if she needs it. SW will continue to follow discharge needs.  Pt states she may need HHC at discharge.              Continued Care and Services - Admitted Since 2/4/2022    Coordination has not been started for this encounter.          Demographic Summary     Row Name 02/05/22 1047       General Information    Admission Type observation    Arrived From home    Referral Source admission list    Reason for Consult discharge planning    Preferred Language English     Used During This Interaction no       Contact Information    Permission Granted to Share Info With permission denied               Functional Status     Row Name 02/05/22 1048       Functional Status    Usual Activity Tolerance good    Current Activity Tolerance good       Functional Status, IADL    Medications independent    Meal Preparation independent    Housekeeping independent    Laundry independent    Shopping independent       Mental Status    General Appearance WDL WDL       Mental Status Summary    Recent Changes in Mental Status/Cognitive Functioning no changes       Employment/    Employment Status disabled; retired    Current or Previous Occupation service industry               Psychosocial    No documentation.                Abuse/Neglect    No documentation.                Legal     Row Name 02/05/22 1049       Financial/Legal    Source of Income social security    Application for Public Assistance applied    Finance Comments Utilities and medications Pt uses assistance programs.       Legal    Criminal Activity/Legal Involvement none               Substance Abuse    No documentation.                Patient Forms    No documentation.                   Chely Dominguez

## 2022-02-05 NOTE — PLAN OF CARE
Goal Outcome Evaluation:  Plan of Care Reviewed With: patient  She is very anxious this shift. PRN meds given and effective. Started new meds and nicotine patch

## 2022-02-05 NOTE — PROGRESS NOTES
[facility]   Hospitalist Progress Note  Date: 2022  Patient Name: Betty Sprague  : 1955  MRN: [id number]  Date of admission: 2022      Subjective   Subjective     Chief Complaint: Headache    Summary: 66-year-old female presented with headache subsequently found to have distillate blood pressure in the 200s upon initial presentation.  She is currently being treated for hypertensive urgency.     Interval Followup: No events overnight.  Patient states that her headache has improved today.  She is resting upon entering room.  She denies any current visual changes, nausea, or episodes emesis.  Per nursing patient does request a nicotine patch.  She denies any current chest pain, chest pressure, or palpitations.  Her blood pressure is improving.    Review of Systems  All systems reviewed are negative except as above in HPI.    Objective   Objective     Vitals:   Temp:  [97.9 °F (36.6 °C)-98.6 °F (37 °C)] 97.9 °F (36.6 °C)  Heart Rate:  [] 74  Resp:  [18-20] 18  BP: (146-243)/() 177/60  Physical Exam   Constitutional: Laying in bed, no acute distress, appears weak  HEENT:  PERRLA, EOMI; No Scleral icterus  Neck:  Supple; No Mass, No Lymphadenopathy  Cardiovascular:  No Rubs, No Edema, No JVD  Respiratory: No respiratory distress, unlabored breathing, saturating well on room air  Abdomen:  Normal BS all 4 Quadrants; No Guarding, No Tenderness  Musculoskeletal:  Pulses Positive all 4 Ext; No Cyanosis, No Edema  Neurological:  Alert+Ox3, Mental status WNL; No Dysarthria  Skin:  No Rash, No Cellulitis, No Erythema    Result Review    Result Review:  I have personally reviewed the results from the time of this admission to 2022 08:59 EST and agree with these findings:  [x]  Laboratory  []  Microbiology  []  Radiology  [x]  EKG/Telemetry   []  Cardiology/Vascular   []  Pathology  [x]  Old records  []  Other:    Assessment/Plan   Assessment / Plan     Assessment:  Hypertensive  urgency  Uncontrolled essential hypertension  COPD, not exacerbated  Coronary disease status post PCI  Hyperlipidemia  History of PE  History of medical noncompliance  Chronic nicotine dependence  History of Takotsubo cardiomyopathy    Plan:  Pressure improved sign, still intermittently elevated, will continue with carvedilol but will increase to 25 mg twice daily  Continue with Imdur and losartan  Renal function remains stable, continue monitor  Patient breathing is stable, continue with bronchodilators  Prior echocardiogram reviewed, EF was 45%, aware  We will discontinue Levemir for now and monitor patient's blood glucose, added correctional insulin  Continue with aspirin, Brilinta, statin, beta-blocker due to history of coronary disease  Added Symbicort, DuoNebs, bronchodilator protocol  Hemoglobin remained stable, continue monitor  Added nicotine patch, counseled patient on smoking cessation going forward  Labs reviewed, records reviewed, image reviewed, vital signs reviewed medications reviewed  Further recommendations pending clinical course      Discussed plan with RN.    DVT prophylaxis:  Medical DVT prophylaxis orders are present.    CODE STATUS:   Level Of Support Discussed With: Patient  Code Status (Patient has no pulse and is not breathing): No CPR (Do Not Attempt to Resuscitate)  Medical Interventions (Patient has pulse or is breathing): Full Support        Electronically signed by Maico Gutierrez DO, 02/05/22, 8:59 AM EST.

## 2022-02-05 NOTE — ED PROVIDER NOTES
Time: 10:10 PM EST   Arrived by: Ambulance  Chief Complaint: Hypertension  History provided by: Patient  History is limited by: N/A    History of Present Illness:    Betty Sprague is a 66 y.o. female who presents to the emergency department today with complaints of moderate and constant hypertension. The patient reports that she does have a known history of hypertension and that she takes medication for this condition. She denies missing any doses recently.    The patient states that her blood pressure has been elevated to approximately 300/100 recently. She is unsure how long her blood pressure has been elevated in total, though per nursing, she first took this reading today. The patient does have an associated headache which is present as a pressure sensation at the top of her head.    The patient denies any vomiting, diarrhea, dysuria, numbness, or tingling. She advises that she does have a cardiac stent. The patient also has a medical history of COPD, congestive heart failure, and diabetes mellitus. She is a current smoker but denies alcohol or drug use. There are no other acute complaints at this time.      History provided by:  Patient   used: No    Hypertension  Severity:  Moderate  Onset quality:  Unable to specify  Duration: Patient unsure, but first took the reading today.  Timing:  Constant  Progression:  Unchanged  Chronicity:  Chronic  Notable PTA blood pressures:  300/100  Context comment:  Patient has a known history of hypertension. She measured her blood pressure to be around 300/100 today.  Relieved by:  Nothing  Worsened by:  Nothing  Ineffective treatments: Home medications for hypertension.  Associated symptoms: headaches (pressure at top of head)    Associated symptoms: no chest pain, no epistaxis, no fever, no neck pain, no shortness of breath and not vomiting    Risk factors: diabetes and tobacco use    Risk factors: no alcohol use    Risk factors comment:  Known history  of hypertension.          Similar Symptoms Previously: Yes.  Recently seen: Patient was seen in the ED on 3/17/2021 with lower extremity swelling. She was also seen by Dr. Mendoza, cardiology, on 2021.      Patient Care Team  Primary Care Provider: Maico Young MD  Cardiologist: Dr. Mendoza    Past Medical History:     Allergies   Allergen Reactions   • Latex Hives     Past Medical History:   Diagnosis Date   • Arthritis    • Benign essential hypertension    • Cervicalgia 2018   • CHF (congestive heart failure) ([facility])    • COPD (chronic obstructive pulmonary disease) ([facility])    • DDD (degenerative disc disease), lumbar    • Dental decay 2017   • Diabetes ([facility])    • Essential hypertension 2017   • Heart attack ([facility])    • Hip fracture ([facility])    • HLD (hyperlipidemia) 2017    will update her lipids   • HTN (hypertension)    • Hyperlipemia    • Leg pain    • Leg swelling    • Leg weakness, bilateral 2018   • Lumbago 2018    with low back pain   • Lumbar back pain    • Lumbar herniated disc    • Mild episode of recurrent major depressive disorder ([facility]) 2017   • Muscle cramps    • Seasonal allergies    • Spinal stenosis of lumbar region 2017   • Spinal stenosis, lumbosacral region 2018   • Tobacco abuse 2017   • Tobacco abuse counseling 2017   • Vascular disease, peripheral ([facility])      Past Surgical History:   Procedure Laterality Date   • CARDIAC SURGERY     •  SECTION     • CORONARY ANGIOPLASTY WITH STENT PLACEMENT     • TONSILLECTOMY       Family History   Problem Relation Age of Onset   • Heart disease Father    • Heart disease Brother    • Cancer Brother         unspecified   • Arthritis Brother        Home Medications:  Prior to Admission medications    Medication Sig Start Date End Date Taking? Authorizing Provider   ALPRAZolam (XANAX) 1 MG tablet Take 1 mg by mouth 4 (Four) Times a Day. 21  Yes Provider, MD Ang   aspirin  81 MG chewable tablet Chew 81 mg Daily. Chew and swallow. 4/2/21  Yes Ang Mercado MD   atorvastatin (LIPITOR) 40 MG tablet Take 40 mg by mouth Daily. 4/12/21  Yes Ang Mercado MD   Brilinta 90 MG tablet tablet Take 90 mg by mouth 2 (Two) Times a Day. 5/7/21  Yes Ang Mercado MD   carvedilol (COREG) 12.5 MG tablet  6/10/21  Yes Ang Mercado MD   citalopram (CeleXA) 10 MG tablet citalopram 10 mg oral tablet take 1 tablet (10 mg) by oral route once daily   Suspended   Yes Ang Mercado MD   ezetimibe (ZETIA) 10 MG tablet TAKE 1 TABLET BY MOUTH EVERY DAY 8/26/21  Yes Selena Lynn APRN   isosorbide mononitrate (IMDUR) 30 MG 24 hr tablet Take 30 mg by mouth Daily.   Yes Ang Mercado MD   Levemir FlexTouch 100 UNIT/ML injection INJECT 25 UNITS UNDER THE SKIN TWICE DAILY 5/18/21  Yes Ang Mercado MD   lisinopril (PRINIVIL,ZESTRIL) 5 MG tablet lisinopril 5 mg oral tablet take 1 tablet (5 mg) by oral route once daily   Active   Yes Ang Mercado MD   losartan (COZAAR) 100 MG tablet Take 100 mg by mouth Daily. 6/3/21  Yes Ang Mercado MD   multivitamin with minerals tablet tablet Take 1 tablet by mouth Daily.   Yes Ang Mercado MD   Ozempic, 0.25 or 0.5 MG/DOSE, 2 MG/1.5ML solution pen-injector INJECT 0.5MG UNDER THE SKIN EVERY WEEK 5/17/21  Yes Ang Mercado MD   pioglitazone (ACTOS) 30 MG tablet Take 30 mg by mouth Daily.   Yes Ang Mercado MD   TiZANidine (ZANAFLEX) 4 MG capsule TAKE ONE CAPSULE BY MOUTH FOUR TIMES DAILY AS NEEDED 4/8/21  Yes Agn Mercado MD   venlafaxine XR (EFFEXOR-XR) 150 MG 24 hr capsule Take 150 mg by mouth Daily.   Yes Ang Mercado MD        Social History:   PT  reports that she has been smoking cigarettes. She has been smoking about 0.50 packs per day. She has never used smokeless tobacco. She reports that she does not drink alcohol and does not use drugs.    Record  "Review:  I have reviewed the patient's records in [facility].     Review of Systems  Review of Systems   Constitutional: Negative for chills and fever.   HENT: Negative for nosebleeds.    Eyes: Negative for redness.   Respiratory: Negative for cough and shortness of breath.    Cardiovascular: Negative for chest pain.   Gastrointestinal: Negative for diarrhea and vomiting.   Genitourinary: Negative for dysuria and frequency.   Musculoskeletal: Negative for back pain and neck pain.   Skin: Negative for rash.   Neurological: Positive for headaches (pressure at top of head). Negative for seizures, syncope and numbness.        No tingling.        Physical Exam  /65   Pulse 79   Temp 98.6 °F (37 °C) (Oral)   Resp 20   Ht 162.6 cm (64\")   Wt 100 kg (221 lb 5.5 oz)   SpO2 93%   Breastfeeding No   BMI 37.99 kg/m²     Physical Exam  Vitals and nursing note reviewed.   Constitutional:       General: She is not in acute distress.  HENT:      Head: Normocephalic and atraumatic.      Nose: Nose normal.      Mouth/Throat:      Mouth: Mucous membranes are moist.   Eyes:      General: No scleral icterus.  Cardiovascular:      Rate and Rhythm: Normal rate and regular rhythm.      Heart sounds: Normal heart sounds. No murmur heard.      Pulmonary:      Effort: No respiratory distress.      Breath sounds: Normal breath sounds.   Abdominal:      Palpations: Abdomen is soft.      Tenderness: There is no abdominal tenderness.   Musculoskeletal:         General: No tenderness. Normal range of motion.      Cervical back: Normal range of motion and neck supple.      Right lower leg: Edema (trace) present.      Left lower leg: Edema (trace) present.   Skin:     General: Skin is warm and dry.   Neurological:      Mental Status: She is alert. Mental status is at baseline.   Psychiatric:         Behavior: Behavior normal.                  ED Course         /65   Pulse 79   Temp 98.6 °F (37 °C) (Oral)   Resp 20   Ht 162.6 cm " "(64\")   Wt 100 kg (221 lb 5.5 oz)   SpO2 93%   Breastfeeding No   BMI 37.99 kg/m²   Results for orders placed or performed during the hospital encounter of 02/04/22   Comprehensive Metabolic Panel    Specimen: Blood   Result Value Ref Range    Glucose 141 (H) 65 - 99 mg/dL    BUN 20 8 - 23 mg/dL    Creatinine 1.00 0.57 - 1.00 mg/dL    Sodium 142 136 - 145 mmol/L    Potassium 4.2 3.5 - 5.2 mmol/L    Chloride 102 98 - 107 mmol/L    CO2 30.7 (H) 22.0 - 29.0 mmol/L    Calcium 9.6 8.6 - 10.5 mg/dL    Total Protein 6.8 6.0 - 8.5 g/dL    Albumin 4.00 3.50 - 5.20 g/dL    ALT (SGPT) 11 1 - 33 U/L    AST (SGOT) 15 1 - 32 U/L    Alkaline Phosphatase 75 39 - 117 U/L    Total Bilirubin 0.5 0.0 - 1.2 mg/dL    eGFR Non African Amer 55 (L) >60 mL/min/1.73    Globulin 2.8 gm/dL    A/G Ratio 1.4 g/dL    BUN/Creatinine Ratio 20.0 7.0 - 25.0    Anion Gap 9.3 5.0 - 15.0 mmol/L   CBC Auto Differential    Specimen: Blood   Result Value Ref Range    WBC 8.22 3.40 - 10.80 10*3/mm3    RBC 3.86 3.77 - 5.28 10*6/mm3    Hemoglobin 11.6 (L) 12.0 - 15.9 g/dL    Hematocrit 35.1 34.0 - 46.6 %    MCV 90.9 79.0 - 97.0 fL    MCH 30.1 26.6 - 33.0 pg    MCHC 33.0 31.5 - 35.7 g/dL    RDW 13.6 12.3 - 15.4 %    RDW-SD 44.2 37.0 - 54.0 fl    MPV 9.1 6.0 - 12.0 fL    Platelets 194 140 - 450 10*3/mm3    Neutrophil % 75.2 42.7 - 76.0 %    Lymphocyte % 14.8 (L) 19.6 - 45.3 %    Monocyte % 6.7 5.0 - 12.0 %    Eosinophil % 2.6 0.3 - 6.2 %    Basophil % 0.5 0.0 - 1.5 %    Immature Grans % 0.2 0.0 - 0.5 %    Neutrophils, Absolute 6.18 1.70 - 7.00 10*3/mm3    Lymphocytes, Absolute 1.22 0.70 - 3.10 10*3/mm3    Monocytes, Absolute 0.55 0.10 - 0.90 10*3/mm3    Eosinophils, Absolute 0.21 0.00 - 0.40 10*3/mm3    Basophils, Absolute 0.04 0.00 - 0.20 10*3/mm3    Immature Grans, Absolute 0.02 0.00 - 0.05 10*3/mm3    nRBC 0.0 0.0 - 0.2 /100 WBC   Troponin    Specimen: Blood   Result Value Ref Range    Troponin T <0.010 0.000 - 0.030 ng/mL   POC Glucose Once    Specimen: " Blood   Result Value Ref Range    Glucose 129 (H) 70 - 99 mg/dL   ECG 12 Lead   Result Value Ref Range    QT Interval 415 ms   Green Top (Gel)   Result Value Ref Range    Extra Tube Hold for add-ons.    Lavender Top   Result Value Ref Range    Extra Tube hold for add-on    Gold Top - SST   Result Value Ref Range    Extra Tube Hold for add-ons.    Light Blue Top   Result Value Ref Range    Extra Tube hold for add-on      Medications   hydrALAZINE (APRESOLINE) injection 20 mg (20 mg Intravenous Given 2/4/22 2150)   labetalol (NORMODYNE,TRANDATE) injection 20 mg (20 mg Intravenous Given 2/4/22 2238)   cloNIDine (CATAPRES) tablet 0.1 mg (0.1 mg Oral Given 2/4/22 2240)   diazePAM (VALIUM) injection 5 mg (5 mg Intravenous Given 2/4/22 2237)     No results found.    Procedures/EKGs:  Procedures       EKG:    Rhythm: Normal sinus rhythm  Rate: 82.  Normal axis.  Normal intervals.  No ST elevations.    EKG Comparison: N/A.    Interpreted by me.          Medical Decision Making:                         MDM  Number of Diagnoses or Management Options  Hypertensive urgency  Diagnosis management comments: The patient´s CBC was reviewed and shows no abnormalities of critical concern. Of note, there is no anemia requiring a blood transfusion and the platelet count is acceptable.  The patient´s CMP was reviewed and shows no abnormalities of critical concern. Of note, the patient´s sodium and potassium are acceptable. The patient´s liver enzymes are unremarkable. The patient´s renal function (creatinine) is preserved. The patient has a normal anion gap.  Troponin is negative.  Patient was given labetalol, clonidine, and hydralazine for blood pressure.  Remained hypertensive.  Case was discussed with Dr. Michel who agrees with admission.    Total Critical Care time of 40 minutes. Total critical care time documented does not include time spent on separately billed procedures for services of nurses or physician assistants. I  personally saw and examined the patient. I have reviewed all diagnostic interpretations and treatment plans as written. I was present for the key portions of any procedures performed and the inclusive time noted in any critical care statement. Critical care time includes patient management by me, time spent at the patients bedside,  time to review lab and imaging results, discussing patient care, documentation in the medical record, and time spent with family or caregiver.       Amount and/or Complexity of Data Reviewed  Clinical lab tests: reviewed  Tests in the medicine section of CPT®: reviewed  Discussion of test results with the performing providers: yes  Discuss the patient with other providers: yes  Independent visualization of images, tracings, or specimens: yes    Risk of Complications, Morbidity, and/or Mortality  Presenting problems: moderate  Management options: moderate  General comments: EKG:    Rhythm: sinus  Rate: 83  Axis: normal  Intervals: normal  ST Segment: no elevations    EKG Comparison: unchanged    Interpreted by me      Critical Care  Total time providing critical care: 30-74 minutes    Patient Progress  Patient progress: stable       Final diagnoses:   Hypertensive urgency          Disposition:  ED Disposition     ED Disposition Condition Comment    Decision to Admit            Documentation assistance provided by Alma Rudd MD acting as scribe for Dr. Alma Rudd. Information recorded by the scribe was done at my direction and has been verified and validated by me.      Rose Mary Ferrer  02/04/22 2222       Alma Rudd MD  02/05/22 0007

## 2022-02-05 NOTE — PLAN OF CARE
Goal Outcome Evaluation:  Plan of Care Reviewed With: patient        Progress: no change       Patient admitted this shift. No acute events overnight

## 2022-02-06 NOTE — PLAN OF CARE
Goal Outcome Evaluation:  Plan of Care Reviewed With: patient        Progress: no change       NO ACUTE EVENTS OVERNIGHT

## 2022-02-06 NOTE — DISCHARGE SUMMARY
[facility]         HOSPITALIST  DISCHARGE SUMMARY    Patient Name: Betty Sprague  : 1955  MRN: [id number]    Date of Admission: 2022  Date of Discharge: 2022  Primary Care Physician: Maico Young MD    Consults     Date and Time Order Name Status Description    2022 10:57 PM Inpatient Hospitalist Consult            Active and Resolved Hospital Problems:  Active Hospital Problems    Diagnosis POA   • Hypertensive urgency [I16.0] Yes   • COPD (chronic obstructive pulmonary disease) ([facility]) [J44.9] Unknown   • Congestive heart failure (CHF) ([facility]) [I50.9] Yes   • Essential hypertension [I10] Yes   • Type 2 diabetes mellitus, without long-term current use of insulin ([facility]) [E11.9] Yes   • Peripheral vascular disease ([facility]) [I73.9] Yes   • Headache [R51.9] Yes   • Coronary artery disease involving native coronary artery of native heart without angina pectoris [I25.10] Yes   • Hyperlipemia, mixed [E78.2] Yes      Resolved Hospital Problems   No resolved problems to display.       Hospital Course     Hospital Course:  Betty Sprague is a 66 y.o. female with medical history significant for CHF, COPD, type 2 diabetes, essential hypertension, hyperlipidemia; presents with headache x1 day.  Patient reports that she started to experience significant headache that prompted her to call EMS.  Patient states she felt that her blood pressure may be elevated which prompted her to want to go to the ER.  In the ER, patient found to have a blood pressure of 220/78.  In the ER, patient was given clonidine 0.1 mg, Valium 5 mg, hydralazine 20 mg, labetalol 20 mg.  Patient was still noted to have a systolic blood pressure in the 200s and hospitalist service called for admission.  Patient reports that she does not care for her current pharmacy.  Patient states that she feels that they get things mixed up.  Patient notes that she is currently on losartan 100 mg, Coreg 12.5 mg and Imdur 30 mg for her  blood pressure.     With initiation of the patient's home oral antihypertensives her blood pressures showed significant improvement.  Her Coreg dose was increased from 12.5 mg twice daily to 25 mg twice daily.   Also of note patient does have a history of diabetes.  Her glucoses were monitored and noted to be fairly well controlled without resumption of her home insulin therapies.  She was treated with sliding scale insulin only as blood glucoses were noted to range from 105-141.  A hemoglobin A1c was checked but is pending at this time.  The patient has been instructed to check her blood glucoses before administering her Levemir and holding if it is less than 100.    Day of Discharge     Vital Signs:  Temp:  [97.5 °F (36.4 °C)-98.1 °F (36.7 °C)] 97.5 °F (36.4 °C)  Heart Rate:  [64-77] 70  Resp:  [16-20] 20  BP: (136-177)/(45-75) 155/75  Physical Exam:   Constitutional: Patient sitting up in the chair at bedside fully dressed in no acute distress.  She is very communicative and quite pleasant.    HEENT:  PER, no Scleral icterus  Neck:  Supple; No Mass, No Lymphadenopathy  Cardiovascular:  No Rubs, No Edema, No JVD  Respiratory: No respiratory distress, unlabored breathing, saturating well on room air  Abdomen:  Normal BS all 4 Quadrants; No Guarding, No Tenderness  Musculoskeletal:  Pulses Positive all 4 Ext; No Cyanosis, No Edema  Neurological:  Alert+Ox3, Mental status WNL; No Dysarthria  Skin:  No Rash, No Cellulitis, No Erythema    Discharge Details        Discharge Medications      Changes to Medications      Instructions Start Date   carvedilol 25 MG tablet  Commonly known as: COREG  What changed:   · medication strength  · how much to take  · how to take this  · when to take this   25 mg, Oral, 2 Times Daily With Meals         Continue These Medications      Instructions Start Date   ALPRAZolam 1 MG tablet  Commonly known as: XANAX   1 mg, Oral, 4 Times Daily      aspirin 81 MG chewable tablet   81 mg, Oral,  Daily, Chew and swallow.       atorvastatin 40 MG tablet  Commonly known as: LIPITOR   40 mg, Oral, Daily      Brilinta 90 MG tablet tablet  Generic drug: ticagrelor   90 mg, Oral, 2 Times Daily      citalopram 10 MG tablet  Commonly known as: CeleXA   citalopram 10 mg oral tablet take 1 tablet (10 mg) by oral route once daily   Suspended      ezetimibe 10 MG tablet  Commonly known as: ZETIA   TAKE 1 TABLET BY MOUTH EVERY DAY      isosorbide mononitrate 30 MG 24 hr tablet  Commonly known as: IMDUR   30 mg, Oral, Daily      Levemir FlexTouch 100 UNIT/ML injection  Generic drug: insulin detemir   INJECT 25 UNITS UNDER THE SKIN TWICE DAILY      losartan 100 MG tablet  Commonly known as: COZAAR   100 mg, Oral, Daily      multivitamin with minerals tablet tablet   1 tablet, Oral, Daily      Ozempic (0.25 or 0.5 MG/DOSE) 2 MG/1.5ML solution pen-injector  Generic drug: Semaglutide(0.25 or 0.5MG/DOS)   INJECT 0.5MG UNDER THE SKIN EVERY WEEK      pioglitazone 30 MG tablet  Commonly known as: ACTOS   30 mg, Oral, Daily      TiZANidine 4 MG capsule  Commonly known as: ZANAFLEX   TAKE ONE CAPSULE BY MOUTH FOUR TIMES DAILY AS NEEDED      venlafaxine  MG 24 hr capsule  Commonly known as: EFFEXOR-XR   150 mg, Oral, Daily         Stop These Medications    lisinopril 5 MG tablet  Commonly known as: PRINIVIL,ZESTRIL            Allergies   Allergen Reactions   • Latex Hives       Discharge Disposition:  Home or Self Care    Diet:  Hospital:  Diet Order   Procedures   • Diet Regular; Consistent Carbohydrate       Discharge Activity:   Activity Instructions     Other Activity Instructions      Activity Instructions: As tolerated          CODE STATUS:  Code Status and Medical Interventions:   Ordered at: 02/05/22 0622     Level Of Support Discussed With:    Patient     Code Status (Patient has no pulse and is not breathing):    No CPR (Do Not Attempt to Resuscitate)     Medical Interventions (Patient has pulse or is breathing):     Full Support         No future appointments.    Additional Instructions for the Follow-ups that You Need to Schedule     Call MD With Problems / Concerns   As directed      Discharge Follow-up with PCP   As directed       Currently Documented PCP:    Maico Young MD    PCP Phone Number:    [phone]     Follow Up Details: hospital follow up 1 week               Pertinent  and/or Most Recent Results     PROCEDURES:   None    LAB RESULTS:      Lab 02/06/22 0417 02/05/22  0431 02/04/22  2146   WBC 7.09 9.19 8.22   HEMOGLOBIN 10.6* 11.3* 11.6*   HEMATOCRIT 32.0* 34.1 35.1   PLATELETS 154 188 194   NEUTROS ABS 4.65 6.23 6.18   IMMATURE GRANS (ABS) 0.02 0.03 0.02   LYMPHS ABS 1.64 1.96 1.22   MONOS ABS 0.56 0.70 0.55   EOS ABS 0.18 0.22 0.21   MCV 91.7 90.9 90.9         Lab 02/06/22 0417 02/05/22  0431 02/04/22  2146   SODIUM 138 143 142   POTASSIUM 4.0 3.5 4.2   CHLORIDE 102 104 102   CO2 26.8 29.5* 30.7*   ANION GAP 9.2 9.5 9.3   BUN 22 18 20   CREATININE 1.03* 0.88 1.00   GLUCOSE 139* 94 141*   CALCIUM 8.8 9.6 9.6   MAGNESIUM 2.3 2.2  --          Lab 02/06/22 0417 02/04/22  2146   TOTAL PROTEIN 6.2 6.8   ALBUMIN 3.40* 4.00   GLOBULIN 2.8 2.8   ALT (SGPT) 8 11   AST (SGOT) 13 15   BILIRUBIN 0.4 0.5   ALK PHOS 62 75         Lab 02/04/22  2146   TROPONIN T <0.010                 Brief Urine Lab Results     None        Microbiology Results (last 10 days)     ** No results found for the last 240 hours. **            Labs Pending at Discharge:  Pending Labs     Order Current Status    Hemoglobin A1c In process            Time spent on Discharge including face to face service: 35 minutes    Electronically signed by Guadalupe Bernal MD, 02/06/22, 2:45 PM EST.

## 2022-02-07 NOTE — OUTREACH NOTE
Prep Survey      Responses   [facility] facility patient discharged from? [facility]   Is LACE score < 7 ? No   Emergency Room discharge w/ pulse ox? No   Eligibility Readm Mgmt   Discharge diagnosis Congestive heart failure (CHF)   Does the patient have one of the following disease processes/diagnoses(primary or secondary)? Other   Does the patient have Home health ordered? No   Is there a DME ordered? No   Comments regarding appointments Follow up with Maico Young MD   Medication alerts for this patient Change to:  Coreg,  STOP - Lisinopril   General alerts for this patient CHF, COPD, type 2 diabetes, essential hypertension, hyperlipidemia   Prep survey completed? Yes          Edith Martinez RN

## 2022-02-08 PROBLEM — I16.1 HYPERTENSIVE EMERGENCY: Status: ACTIVE | Noted: 2022-01-01

## 2022-02-08 NOTE — ED PROVIDER NOTES
Time: 6:48 AM EST  Arrived by: ambulance  Chief Complaint: SOB  History provided by: pt  History is limited by: N/A     History of Present Illness:    Betty Sprague is a 66 y.o. female who presents to the emergency department today with complaints of shortness of breath since 0300 this morning. Pt states she knows she is short of breath because her blood pressure is too high again. Pt was seen in this ED four days ago for her blood pressure where she was admitted and kept for three days due to hypertensive urgency. Upon arrival she has a BP of 244/114. She denies nausea, emesis, cough, chest pain, abdominal pain, fever, or any other pertinent sx.         History provided by:  Patient   used: No      Patient Care Team  Primary Care Provider: Maico Young MD    Past Medical History:     Allergies   Allergen Reactions   • Latex Hives     Past Medical History:   Diagnosis Date   • Arthritis    • Benign essential hypertension    • Cervicalgia 2018   • CHF (congestive heart failure) ([facility])    • COPD (chronic obstructive pulmonary disease) ([facility])    • DDD (degenerative disc disease), lumbar    • Dental decay 2017   • Diabetes ([facility])    • Essential hypertension 2017   • Heart attack ([facility])    • Hip fracture ([facility])    • HLD (hyperlipidemia) 2017    will update her lipids   • HTN (hypertension)    • Hyperlipemia    • Leg pain    • Leg swelling    • Leg weakness, bilateral 2018   • Lumbago 2018    with low back pain   • Lumbar back pain    • Lumbar herniated disc    • Mild episode of recurrent major depressive disorder (HCC) 2017   • Muscle cramps    • Seasonal allergies    • Spinal stenosis of lumbar region 2017   • Spinal stenosis, lumbosacral region 2018   • Tobacco abuse 2017   • Tobacco abuse counseling 2017   • Vascular disease, peripheral (HCC)      Past Surgical History:   Procedure Laterality Date   • CARDIAC SURGERY     •   SECTION     • CORONARY ANGIOPLASTY WITH STENT PLACEMENT     • TONSILLECTOMY       Family History   Problem Relation Age of Onset   • Heart disease Father    • Heart disease Brother    • Cancer Brother         unspecified   • Arthritis Brother        Home Medications:  Prior to Admission medications    Medication Sig Start Date End Date Taking? Authorizing Provider   ALPRAZolam (XANAX) 1 MG tablet Take 1 mg by mouth 4 (Four) Times a Day. 6/2/21   Ang Mercado MD   aspirin 81 MG chewable tablet Chew 81 mg Daily. Chew and swallow. 4/2/21   Ang Mercado MD   atorvastatin (LIPITOR) 40 MG tablet Take 40 mg by mouth Daily. 4/12/21   Ang Mercado MD   Brilinta 90 MG tablet tablet Take 90 mg by mouth 2 (Two) Times a Day. 5/7/21   Ang Mercado MD   carvedilol (COREG) 25 MG tablet Take 1 tablet by mouth 2 (Two) Times a Day With Meals. 2/6/22   Guadalupe Bernal MD   citalopram (CeleXA) 10 MG tablet citalopram 10 mg oral tablet take 1 tablet (10 mg) by oral route once daily   Suspended    Ang Mercado MD   ezetimibe (ZETIA) 10 MG tablet TAKE 1 TABLET BY MOUTH EVERY DAY 8/26/21   Selena Lynn APRN   isosorbide mononitrate (IMDUR) 30 MG 24 hr tablet Take 30 mg by mouth Daily.    Ang Mercado MD   Levemir FlexTouch 100 UNIT/ML injection INJECT 25 UNITS UNDER THE SKIN TWICE DAILY 5/18/21   Ang Mercado MD   losartan (COZAAR) 100 MG tablet Take 100 mg by mouth Daily. 6/3/21   Ang Mercado MD   multivitamin with minerals tablet tablet Take 1 tablet by mouth Daily.    Ang Mercado MD   Ozempic, 0.25 or 0.5 MG/DOSE, 2 MG/1.5ML solution pen-injector INJECT 0.5MG UNDER THE SKIN EVERY WEEK 5/17/21   Ang Mercado MD   pioglitazone (ACTOS) 30 MG tablet Take 30 mg by mouth Daily.    Ang Mercado MD   TiZANidine (ZANAFLEX) 4 MG capsule TAKE ONE CAPSULE BY MOUTH FOUR TIMES DAILY AS NEEDED 4/8/21   Ang Mercado MD  "  venlafaxine XR (EFFEXOR-XR) 150 MG 24 hr capsule Take 150 mg by mouth Daily.    Provider, Historical, MD        Social History:   Social History     Tobacco Use   • Smoking status: Current Every Day Smoker     Packs/day: 0.50     Types: Cigarettes   • Smokeless tobacco: Never Used   Vaping Use   • Vaping Use: Never used   Substance Use Topics   • Alcohol use: Never   • Drug use: Never     Recent travel: no     Review of Systems:  Review of Systems   Constitutional: Negative for chills and fever.   HENT: Negative for congestion, rhinorrhea and sore throat.    Eyes: Negative for pain and visual disturbance.   Respiratory: Negative for apnea, cough, chest tightness and shortness of breath.    Cardiovascular: Negative for chest pain and palpitations.   Gastrointestinal: Negative for abdominal pain, diarrhea, nausea and vomiting.   Genitourinary: Negative for difficulty urinating and dysuria.   Musculoskeletal: Negative for joint swelling and myalgias.   Skin: Negative for color change.   Neurological: Negative for seizures and headaches.   Psychiatric/Behavioral: Negative.    All other systems reviewed and are negative.       Physical Exam:  /97 (BP Location: Right arm)   Pulse 106   Temp 97.8 °F (36.6 °C) (Oral)   Resp 24   Ht 162.6 cm (64\")   Wt 104 kg (230 lb 6.1 oz)   SpO2 97%   BMI 39.54 kg/m²     Physical Exam  Vitals and nursing note reviewed.   Constitutional:       General: She is not in acute distress.     Appearance: Normal appearance. She is not toxic-appearing.   HENT:      Head: Normocephalic and atraumatic.      Jaw: There is normal jaw occlusion.   Eyes:      General: Lids are normal.      Extraocular Movements: Extraocular movements intact.      Conjunctiva/sclera: Conjunctivae normal.      Pupils: Pupils are equal, round, and reactive to light.   Cardiovascular:      Rate and Rhythm: Normal rate and regular rhythm.      Pulses: Normal pulses.      Heart sounds: Normal heart sounds. "   Pulmonary:      Effort: Pulmonary effort is normal. No respiratory distress.      Breath sounds: Rales (mild) present. No wheezing or rhonchi.   Abdominal:      General: Abdomen is flat.      Palpations: Abdomen is soft.      Tenderness: There is no abdominal tenderness. There is no guarding or rebound.   Musculoskeletal:         General: Normal range of motion.      Cervical back: Normal range of motion and neck supple.      Right lower leg: No edema.      Left lower leg: No edema.   Skin:     General: Skin is warm and dry.   Neurological:      Mental Status: She is alert and oriented to person, place, and time. Mental status is at baseline.   Psychiatric:         Mood and Affect: Mood normal.                Medications in the Emergency Department:  Medications   aspirin chewable tablet 81 mg (has no administration in time range)   atorvastatin (LIPITOR) tablet 40 mg (has no administration in time range)   ticagrelor (BRILINTA) tablet 90 mg (has no administration in time range)   carvedilol (COREG) tablet 25 mg (has no administration in time range)   isosorbide mononitrate (IMDUR) 24 hr tablet 30 mg (has no administration in time range)   losartan (COZAAR) tablet 100 mg (has no administration in time range)   venlafaxine XR (EFFEXOR-XR) 24 hr capsule 150 mg (has no administration in time range)   insulin detemir (LEVEMIR) injection 20 Units (has no administration in time range)   sodium chloride 0.9 % flush 10 mL (has no administration in time range)   sodium chloride 0.9 % flush 10 mL (has no administration in time range)   acetaminophen (TYLENOL) tablet 650 mg (has no administration in time range)   calcium carbonate (TUMS) chewable tablet 500 mg (200 mg elemental) (has no administration in time range)   ondansetron (ZOFRAN) tablet 4 mg (has no administration in time range)   Pharmacy to Dose enoxaparin (LOVENOX) (has no administration in time range)   dextrose (GLUTOSE) oral gel 15 g (has no administration in  time range)   dextrose 10 % infusion (has no administration in time range)   glucagon (human recombinant) (GLUCAGEN DIAGNOSTIC) injection 1 mg (has no administration in time range)   insulin lispro (humaLOG) injection 0-7 Units (has no administration in time range)   metoprolol tartrate (LOPRESSOR) injection 5 mg (has no administration in time range)   enoxaparin (LOVENOX) syringe 40 mg (has no administration in time range)   ALPRAZolam (XANAX) tablet 1 mg (has no administration in time range)   hydrALAZINE (APRESOLINE) injection 20 mg (20 mg Intravenous Given 2/8/22 0732)   cloNIDine (CATAPRES) tablet 0.1 mg (0.1 mg Oral Given 2/8/22 0732)   LORazepam (ATIVAN) injection 1 mg (1 mg Intravenous Given 2/8/22 0733)   labetalol (NORMODYNE,TRANDATE) injection 20 mg (20 mg Intravenous Given 2/8/22 0950)        Labs  Lab Results (last 24 hours)     Procedure Component Value Units Date/Time    CBC & Differential [[id number]]  (Abnormal) Collected: 02/08/22 0657    Specimen: Blood Updated: 02/08/22 0716    Narrative:      The following orders were created for panel order CBC & Differential.  Procedure                               Abnormality         Status                     ---------                               -----------         ------                     CBC Auto Differential[[id number]]        Abnormal            Final result                 Please view results for these tests on the individual orders.    Comprehensive Metabolic Panel [[id number]]  (Abnormal) Collected: 02/08/22 0657    Specimen: Blood Updated: 02/08/22 0739     Glucose 145 mg/dL      BUN 18 mg/dL      Creatinine 0.94 mg/dL      Sodium 141 mmol/L      Potassium 4.1 mmol/L      Chloride 101 mmol/L      CO2 32.1 mmol/L      Calcium 9.6 mg/dL      Total Protein 7.8 g/dL      Albumin 4.40 g/dL      ALT (SGPT) 18 U/L      AST (SGOT) 22 U/L      Alkaline Phosphatase 83 U/L      Total Bilirubin 0.5 mg/dL      eGFR Non African Amer 60 mL/min/1.73      Globulin  3.4 gm/dL      A/G Ratio 1.3 g/dL      BUN/Creatinine Ratio 19.1     Anion Gap 7.9 mmol/L     Narrative:      GFR Normal >60  Chronic Kidney Disease <60  Kidney Failure <15      BNP [[id number]]  (Normal) Collected: 02/08/22 0657    Specimen: Blood Updated: 02/08/22 0736     proBNP 526.5 pg/mL     Narrative:      Among patients with dyspnea, NT-proBNP is highly sensitive for the detection of acute congestive heart failure. In addition NT-proBNP of <300 pg/ml effectively rules out acute congestive heart failure with 99% negative predictive value.    Results may be falsely decreased if patient taking Biotin.      Troponin [[id number]]  (Normal) Collected: 02/08/22 0657    Specimen: Blood Updated: 02/08/22 0736     Troponin T <0.010 ng/mL     Narrative:      Troponin T Reference Range:  <= 0.03 ng/mL-   Negative for AMI  >0.03 ng/mL-     Abnormal for myocardial necrosis.  Clinicians would have to utilize clinical acumen, EKG, Troponin and serial changes to determine if it is an Acute Myocardial Infarction or myocardial injury due to an underlying chronic condition.       Results may be falsely decreased if patient taking Biotin.      CBC Auto Differential [[id number]]  (Abnormal) Collected: 02/08/22 0657    Specimen: Blood Updated: 02/08/22 0716     WBC 10.41 10*3/mm3      RBC 4.20 10*6/mm3      Hemoglobin 12.6 g/dL      Hematocrit 38.6 %      MCV 91.9 fL      MCH 30.0 pg      MCHC 32.6 g/dL      RDW 13.5 %      RDW-SD 45.7 fl      MPV 9.4 fL      Platelets 178 10*3/mm3      Neutrophil % 80.9 %      Lymphocyte % 9.9 %      Monocyte % 5.9 %      Eosinophil % 2.4 %      Basophil % 0.4 %      Immature Grans % 0.5 %      Neutrophils, Absolute 8.43 10*3/mm3      Lymphocytes, Absolute 1.03 10*3/mm3      Monocytes, Absolute 0.61 10*3/mm3      Eosinophils, Absolute 0.25 10*3/mm3      Basophils, Absolute 0.04 10*3/mm3      Immature Grans, Absolute 0.05 10*3/mm3      nRBC 0.0 /100 WBC     Troponin [[id number]]  (Normal) Collected:  02/08/22 0657    Specimen: Blood Updated: 02/08/22 1044     Troponin T <0.010 ng/mL     Narrative:      Troponin T Reference Range:  <= 0.03 ng/mL-   Negative for AMI  >0.03 ng/mL-     Abnormal for myocardial necrosis.  Clinicians would have to utilize clinical acumen, EKG, Troponin and serial changes to determine if it is an Acute Myocardial Infarction or myocardial injury due to an underlying chronic condition.       Results may be falsely decreased if patient taking Biotin.      Urinalysis With Culture If Indicated - Urine, Clean Catch [[id number]]  (Abnormal) Collected: 02/08/22 1100    Specimen: Urine, Clean Catch Updated: 02/08/22 1129     Color, UA Yellow     Appearance, UA Clear     pH, UA 7.5     Specific Gravity, UA 1.007     Glucose, UA Negative     Ketones, UA Negative     Bilirubin, UA Negative     Blood, UA Trace     Protein, UA 30 mg/dL (1+)     Leuk Esterase, UA Negative     Nitrite, UA Negative     Urobilinogen, UA 0.2 E.U./dL    Urinalysis, Microscopic Only - Urine, Clean Catch [[id number]]  (Abnormal) Collected: 02/08/22 1100    Specimen: Urine, Clean Catch Updated: 02/08/22 1129     RBC, UA 3-5 /HPF      WBC, UA 0-2 /HPF      Bacteria, UA None Seen /HPF      Squamous Epithelial Cells, UA 0-2 /HPF      Hyaline Casts, UA None Seen /LPF      Methodology Automated Microscopy    COVID-19,APTIMA PANTHER(CARISSA),[facility], NP/OP SWAB IN UTM/VTM/SALINE TRANSPORT MEDIA,24 HR TAT - Swab, Nasopharynx [[id number]] Collected: 02/08/22 1101    Specimen: Swab from Nasopharynx Updated: 02/08/22 1120    Troponin [[id number]]  (Normal) Collected: 02/08/22 1326    Specimen: Blood from Arm, Right Updated: 02/08/22 1350     Troponin T <0.010 ng/mL     Narrative:      Troponin T Reference Range:  <= 0.03 ng/mL-   Negative for AMI  >0.03 ng/mL-     Abnormal for myocardial necrosis.  Clinicians would have to utilize clinical acumen, EKG, Troponin and serial changes to determine if it is an Acute Myocardial Infarction or  myocardial injury due to an underlying chronic condition.       Results may be falsely decreased if patient taking Biotin.             Imaging:  XR Chest 1 View    Result Date: 2/8/2022  PROCEDURE: XR CHEST 1 VW  COMPARISON: [facility], , CHEST AP/PA 1 VIEW, 3/17/2021, 14:35.  INDICATIONS: SOA Triage Protocol, high BP  FINDINGS:  No acute infiltrate or effusion is identified.  Cardiac and mediastinal silhouettes appear normal.  Increased interstitial markings are noted in the lower lung fields bilaterally, suspected to be related to chronic interstitial lung disease such as from a history of smoking.        1. No acute infiltrate       Adams North M.D.       Electronically Signed and Approved By: Adams North M.D. on 2/08/2022 at 8:42               Procedures:  Procedures    EKG:    Rhythm: sinus  Rate: 93  Intervals: normal  T-wave: normal  ST Segment: normal    EKG Comparison: unchanged    Interpreted by me    Progress                            Medical Decision Making:  MDM  Number of Diagnoses or Management Options  Hypertensive emergency  Diagnosis management comments: The patient´s CBC was reviewed and shows no abnormalities of critical concern. Of note, there is no anemia requiring a blood transfusion and the platelet count is acceptable.  The patient´s CMP was reviewed and shows no abnormalities of critical concern. Of note, the patient´s sodium and potassium are acceptable. The patient´s liver enzymes are unremarkable. The patient´s renal function (creatinine) is preserved. The patient has a normal anion gap.  Urinalysis is negative for bacteriuria.  Chest x-ray is negative for pneumonia.  Patient was given hydralazine, clonidine and labetalol IV in the emergency department.  Case was discussed with Dr. Harvey who agrees with admission.    Total Critical Care time of 40 minutes. Total critical care time documented does not include time spent on separately billed procedures for services of  nurses or physician assistants. I personally saw and examined the patient. I have reviewed all diagnostic interpretations and treatment plans as written. I was present for the key portions of any procedures performed and the inclusive time noted in any critical care statement. Critical care time includes patient management by me, time spent at the patients bedside,  time to review lab and imaging results, discussing patient care, documentation in the medical record, and time spent with family or caregiver.       Amount and/or Complexity of Data Reviewed  Clinical lab tests: reviewed  Tests in the radiology section of CPT®: reviewed  Tests in the medicine section of CPT®: reviewed  Discussion of test results with the performing providers: yes  Discuss the patient with other providers: yes  Independent visualization of images, tracings, or specimens: yes    Risk of Complications, Morbidity, and/or Mortality  Presenting problems: moderate  Management options: moderate    Patient Progress  Patient progress: stable       Final diagnoses:   Hypertensive emergency        Disposition:  ED Disposition     ED Disposition Condition Comment    Decision to Admit  Level of Care: Telemetry [5]   Diagnosis: Hypertensive emergency [078464]   Admitting Physician: TREVIN DENNIS [[id number]]   Attending Physician: TREVIN DENNIS [[id number]]   Isolate for COVID?: Yes [1]   Certification: I Certify That Inpatient Hospital Services Are Medically Necessary For Greater Than 2 Midnights            This medical record created using voice recognition software and may contain unintended errors.    Documentation assistance provided by Margarita Ennis acting as scribe for Alma Rudd MD Information recorded by the scribe was done at my direction and has been verified and validated by me.          Margarita Ennis  02/08/22 0652       Margarita Ennis  02/08/22 0656       Alma Rudd MD  02/08/22 3946

## 2022-02-08 NOTE — H&P
[facility] HISTORY AND PHYSICAL  Date: 2022   Patient Name: Betty Sprague  : 1955  MRN: [id number]  Primary Care Physician:  Maico Young MD  Date of admission: 2022    Subjective   Subjective     Chief Complaint:   Shortness of breath    HPI:    Betty Sprague is a 66 y.o. female with a past medical history significant for CHF, COPD, diabetes, hypertension, hyperlipidemia, and recent admission for hypertensive urgency.  Patient presents emergency department just 2 days after her last discharge with complaints of not feeling well at home.  Patient endorses weakness and shortness of breath.  In the emergency department patient requiring 2 L to maintain saturations greater than 90%, complaining of dyspnea.  Patient found to be significantly hypertensive with a systolic blood pressure of 244 on arrival.  In the emergency department patient received clonidine, hydralazine, labetalol with improvement in blood pressure.  On my exam patient with a systolic blood pressure of 140 and feeling well.  Patient denies any fevers or chills.  No nausea no vomiting no diarrhea no constipation.  Patient endorses no recent sick contacts.  States since she has been home has been taking her home medications.  However is unaware of any of the names.  Patient with no significant lab abnormalities in the ED.  Chest x-ray per my read with no acute abnormalities.  Covid test pending      Personal History     Past Medical History:  Hypertension  COPD  Diabetes  Hyperlipidemia  Heart failure with reduced ejection fraction, EF 45%    Past Surgical History:  Cardiac catheterizations    Tonsillectomy    Family History:   Heart disease in brothers and father    Social History:   Patient is a current everyday smoker of half pack per day  Denies alcohol use  Denies illicit drug use    Home Medications:  ALPRAZolam, Semaglutide(0.25 or 0.5MG/DOS), TiZANidine, albuterol sulfate HFA, aspirin,  atorvastatin, carvedilol, citalopram, ezetimibe, insulin detemir, isosorbide mononitrate, levocetirizine, losartan, multivitamin with minerals, pioglitazone, ticagrelor, and venlafaxine XR    Allergies:  Allergies   Allergen Reactions   • Latex Hives       Review of Systems   All systems were reviewed and negative except for: Dyspnea, weakness    Objective   Objective     Vitals:   Temp:  [97.8 °F (36.6 °C)] 97.8 °F (36.6 °C)  Heart Rate:  [] 106  Resp:  [24-28] 24  BP: (174-244)/() 174/97  Flow (L/min):  [2] 2    Physical Exam    Constitutional: Awake, alert, resting comfortably in bed, breathing comfortably with nasal cannula in place   Eyes: Pupils equal, sclerae anicteric, no conjunctival injection   HENT: NCAT, mucous membranes moist   Neck: Supple, no thyromegaly, no lymphadenopathy, trachea midline   Respiratory: Diminished bibasilar, no wheezing no rhonchi no crackles   Cardiovascular: RRR, no murmurs, rubs, or gallops, palpable pedal pulses bilaterally   Gastrointestinal: Positive bowel sounds, soft, nontender, nondistended   Musculoskeletal: No bilateral ankle edema, no clubbing or cyanosis to extremities   Psychiatric: Appropriate affect, cooperative   Neurologic: Oriented x 3, strength symmetric in all extremities, Cranial Nerves grossly intact to confrontation, speech clear   Skin: No rashes     Result Review    Result Review:  I have personally reviewed the results from the time of this admission to 2/8/2022 15:50 EST and agree with these findings:  [x]  Laboratory  [x]  Microbiology  [x]  Radiology  [x]  EKG/Telemetry   [x]  Cardiology/Vascular   []  Pathology  [x]  Old records  []  Other:      Assessment/Plan   Assessment / Plan     Assessment/Plan:   Hypertensive urgency  Acute hypoxic respiratory failure, requiring 2 L nasal cannula, dyspneic on arrival  History of hypertension  Weakness  COPD not exacerbation  Heart failure with reduced ejection fraction, not in  exacerbation  Hyperlipidemia  Diabetes    Plan:  Patient to be admitted to hospital for further care management  Patient started on medications in the ED with improvement in blood pressure  Ordering patient's home meds now, patient's blood pressures controlled on this regiment on her recent hospitalization  As needed IV medications are available for persistent hypertension  Covid testing given patient's hypoxia, this is returned negative  Continue to trend troponins  Given concern for flash pulmonary edema, if needed will start diuretics  Resuming other home medications as indicated  PT and OT evaluation, patient likely need SNF discharge    DVT prophylaxis:  Medical DVT prophylaxis orders are present.    CODE STATUS:    Code Status (Patient has no pulse and is not breathing): CPR (Attempt to Resuscitate)  Medical Interventions (Patient has pulse or is breathing): Full Support      Admission Status:  I believe this patient meets inpatient status.    Electronically signed by Stefan Newsome MD, 02/08/22, 3:50 PM EST.

## 2022-02-09 NOTE — PLAN OF CARE
Goal Outcome Evaluation:  Plan of Care Reviewed With: patient           Outcome Summary: At this time, pt demonstrates safety in ambulation and transfers w/n a household distance and is safe to d/c home.

## 2022-02-09 NOTE — PLAN OF CARE
Goal Outcome Evaluation:  Plan of Care Reviewed With: patient        Progress: improving  Outcome Summary: Patient rested well throughout the shift. BP remaining stable. No further complaints.

## 2022-02-09 NOTE — CASE MANAGEMENT/SOCIAL WORK
RT CM triggered to see pt with hx of COPD and LACE of 11.  Pt currently admitted with hypertension. Ms Sprague denies any hx of COPD, but but confirms smoking 1/2ppd for at least 16 yrs. SOA with exertion and orthopnea.  Pt states she does not see at pulmonologist and has never had a PFG or PSG.  She does state she has a rescue inhaler, which was prescribed by her PCP, but does not need to use it.  RT CM provided pt information regarding COPD disease process, symptoms and its diagnosis.  Pt is interested in getting a PFT as an outpatient.  RT CM explained she would need a doctor's order for this outpatient procedure.  Smoking cessation briefly discussed and states she's about done with it since this scare.  RT CM recommended NRT and counseling with 1-800-quit -now.

## 2022-02-09 NOTE — OUTREACH NOTE
Medical Week 1 Survey      Responses   [facility] patient discharged from? [facility]   Does the patient have one of the following disease processes/diagnoses(primary or secondary)? Other   Week 1 attempt successful? No   Unsuccessful attempts Attempt 1   Rescheduled Revoked   Revoke Readmitted          Tara Colón RN

## 2022-02-09 NOTE — PLAN OF CARE
Goal Outcome Evaluation: VS stable this shift. Complaints of hip pain givevn Tylenol. Per pt did not help. Call light within reach.

## 2022-02-09 NOTE — PROGRESS NOTES
[facility]   Hospitalist Progress Note  Date: 2022  Patient Name: Betty Sprague  : 1955  MRN: [id number]  Date of admission: 2022      Subjective   Subjective     Chief Complaint:   Shortness of breath    Summary:   Betty Sprague is a 66 y.o. female with a past medical history significant for CHF, COPD, diabetes, hypertension, hyperlipidemia, and recent admission for hypertensive urgency.  Patient presents emergency department just 2 days after her last discharge with complaints of not feeling well at home.  Patient endorses weakness and shortness of breath.  In the emergency department patient requiring 2 L to maintain saturations greater than 90%, complaining of dyspnea.  Patient found to be significantly hypertensive with a systolic blood pressure of 244 on arrival.  In the emergency department patient received clonidine, hydralazine, labetalol with improvement in blood pressure.  On my exam patient with a systolic blood pressure of 140 and feeling well.  Patient denies any fevers or chills.  No nausea no vomiting no diarrhea no constipation.  Patient endorses no recent sick contacts.  States since she has been home has been taking her home medications.  However is unaware of any of the names.  Patient with no significant lab abnormalities in the ED.  Chest x-ray per my read with no acute abnormalities.  Covid test returned negative    Interval Followup:   After resumption of patient's home medications blood pressure much better this morning.  We will continue to monitor through the day, if needed will uptitrate.  Patient stated that recently her pharmacy has been changing her medications without consent from her physicians, stating that medications were changed between generics and brand names frequently.  Patient also claims that despite taking these medications at home her blood pressure will inevitably shoot up through the day.  Therefore we will continue to monitor.  Patient was still  on nasal cannula this morning on rounds, will wean off, patient saturating greater than 96% does not need additional oxygen.  PT and OT to evaluate patient for SNF versus home with home health     Review of Systems   All systems were reviewed and negative except for: Continued weakness    Objective   Objective     Vitals:   Temp:  [97.9 °F (36.6 °C)-98.4 °F (36.9 °C)] 97.9 °F (36.6 °C)  Heart Rate:  [65-76] 73  Resp:  [16-24] 16  BP: (130-209)/(60-97) 156/67  Flow (L/min):  [2] 2  Physical Exam               Constitutional: Awake, alert, resting comfortably in bed, breathing comfortably with nasal cannula in place              Eyes: Pupils equal, sclerae anicteric, no conjunctival injection              HENT: NCAT, mucous membranes moist              Neck: Supple, no thyromegaly, no lymphadenopathy, trachea midline              Respiratory: Diminished bibasilar, no wheezing no rhonchi no crackles              Cardiovascular: RRR, no murmurs, rubs, or gallops, palpable pedal pulses bilaterally              Gastrointestinal: Positive bowel sounds, soft, nontender, nondistended              Musculoskeletal: No bilateral ankle edema, no clubbing or cyanosis to extremities              Psychiatric: Appropriate affect, cooperative              Neurologic: Oriented x 3, strength symmetric in all extremities, Cranial Nerves grossly intact to confrontation, speech clear              Skin: No rashes     Result Review    Result Review:  I have personally reviewed the results from the time of this admission to 2/9/2022 11:21 EST and agree with these findings:  [x]  Laboratory  [x]  Microbiology  [x]  Radiology  [x]  EKG/Telemetry   []  Cardiology/Vascular   []  Pathology  []  Old records  []  Other:    Assessment/Plan   Assessment / Plan     Assessment/Plan:  Hypertensive urgency   Acute hypoxic respiratory failure, requiring 2 L nasal cannula, dyspneic on arrival  History of hypertension  Weakness  COPD not exacerbation  Heart  failure with reduced ejection fraction, not in exacerbation  Hyperlipidemia  Diabetes     Plan:  Patient remains admitted for further care management  Allowing resumption of patient's home medications, blood pressure improved, continue to titrate and monitor  As needed IV medications are available for persistent hypertension  Covid testing negative  Wean off of oxygen  Opponents all negative  No need for diuretics, continue to monitor  Resuming other home medications as indicated  PT and OT evaluation, evaluation for SNF versus home with home health     Discussed plan with RN,     DVT prophylaxis:  Medical DVT prophylaxis orders are present.    CODE STATUS:   Code Status (Patient has no pulse and is not breathing): CPR (Attempt to Resuscitate)  Medical Interventions (Patient has pulse or is breathing): Full Support    Electronically signed by Stefan Newsome MD, 02/09/22, 11:21 AM EST.    CBC    CBC 2/6/22 2/8/22 2/9/22   WBC 7.09 10.41 7.98   RBC 3.49 (A) 4.20 3.58 (A)   Hemoglobin 10.6 (A) 12.6 10.6 (A)   Hematocrit 32.0 (A) 38.6 32.8 (A)   MCV 91.7 91.9 91.6   MCH 30.4 30.0 29.6   MCHC 33.1 32.6 32.3   RDW 13.5 13.5 13.3   Platelets 154 178 148   (A) Abnormal value              CMP    CMP 2/6/22 2/8/22 2/9/22   Glucose 139 (A) 145 (A) 125 (A)   BUN 22 18 24 (A)   Creatinine 1.03 (A) 0.94 1.01 (A)   eGFR Non African Am 54 (A) 60 (A) 55 (A)   Sodium 138 141 138   Potassium 4.0 4.1 3.9   Chloride 102 101 102   Calcium 8.8 9.6 9.1   Albumin 3.40 (A) 4.40    Total Bilirubin 0.4 0.5    Alkaline Phosphatase 62 83    AST (SGOT) 13 22    ALT (SGPT) 8 18    (A) Abnormal value

## 2022-02-09 NOTE — PLAN OF CARE
Goal Outcome Evaluation:  Plan of Care Reviewed With: patient        Progress: no change (initial evaluation encounter)  Outcome Summary: Patient presents at or near baseline functional status with minimal functional deficits identified that impact patient independence. Patient with decreased UE strength, provided HEP to address UE strength deficits. Patient with lack of deficits related to functional balance, mobility, ADL transfers or task completion. No inidcated need for skilled occupational therapy intervention in the acute care setting at this time. Patient educated to remain active with assistance from staff in acute setting to maintain level of independence. OT will sign off at this time.

## 2022-02-09 NOTE — THERAPY EVALUATION
Patient Name: Betty Sprague  : 1955    MRN: [id number]                              Today's Date: 2022       Admit Date: 2022    Visit Dx:     ICD-10-CM ICD-9-CM   1. Hypertensive emergency  I16.1 401.9   2. Decreased activities of daily living (ADL)  Z78.9 V49.89     Patient Active Problem List   Diagnosis   • Coronary artery disease involving native coronary artery of native heart without angina pectoris   • Nonischemic dilated cardiomyopathy (HCC)   • Hyperlipemia, mixed   • History of coronary angioplasty with insertion of stent   • Hypertensive urgency   • COPD (chronic obstructive pulmonary disease) ([facility])   • Congestive heart failure (CHF) ([facility])   • Essential hypertension   • Type 2 diabetes mellitus, without long-term current use of insulin ([facility])   • Peripheral vascular disease ([facility])   • Headache   • Hypertensive emergency     Past Medical History:   Diagnosis Date   • Arthritis    • Benign essential hypertension    • Cervicalgia 2018   • CHF (congestive heart failure) ([facility])    • COPD (chronic obstructive pulmonary disease) ([facility])    • DDD (degenerative disc disease), lumbar    • Dental decay 2017   • Diabetes ([facility])    • Essential hypertension 2017   • Heart attack ([facility])    • Hip fracture ([facility])    • HLD (hyperlipidemia) 2017    will update her lipids   • HTN (hypertension)    • Hyperlipemia    • Leg pain    • Leg swelling    • Leg weakness, bilateral 2018   • Lumbago 2018    with low back pain   • Lumbar back pain    • Lumbar herniated disc    • Mild episode of recurrent major depressive disorder (HCC) 2017   • Muscle cramps    • Seasonal allergies    • Spinal stenosis of lumbar region 2017   • Spinal stenosis, lumbosacral region 2018   • Tobacco abuse 2017   • Tobacco abuse counseling 2017   • Vascular disease, peripheral ([facility])      Past Surgical History:   Procedure Laterality Date   • CARDIAC SURGERY     •  SECTION      • CORONARY ANGIOPLASTY WITH STENT PLACEMENT     • TONSILLECTOMY        General Information     Row Name 02/09/22 0902          OT Time and Intention    Document Type evaluation  -ES     Mode of Treatment individual therapy; occupational therapy  -ES     Row Name 02/09/22 0902          General Information    Patient Profile Reviewed yes  -ES     Prior Level of Function independent:  Patient reports independence with B and IADLs at baseline. No device for mobility in the home, cane for community mobility. Tub/shower combo, denies DME: requesting tub bench. Denies home O2 use.  -ES     Existing Precautions/Restrictions oxygen therapy device and L/min  -ES     Row Name 02/09/22 0902          Occupational Profile    Reason for Services/Referral (Occupational Profile) Patient is 66 yr old female admitted to [facility] on [facility]/8/2022 with complaints of weakness, SOA. Patient with acute hypoxic respiratory failure: recently discharged from hospital and re admitted for similar diagnosis from previous admittance. OT evaluation and treatment ordered d/t recent decline in ADLs/transfer ability. No previous OT services for current condition.  -ES     Row Name 02/09/22 0902          Living Environment    Lives With alone  -ES     Row Name 02/09/22 0902          Home Main Entrance    Number of Stairs, Main Entrance none  -ES     Row Name 02/09/22 0902          Stairs Within Home, Primary    Number of Stairs, Within Home, Primary five  -ES     Row Name 02/09/22 0902          Cognition    Orientation Status (Cognition) oriented x 3  -ES     Row Name 02/09/22 0902          Safety Issues, Functional Mobility    Impairments Affecting Function (Mobility) endurance/activity tolerance; strength  -ES           User Key  (r) = Recorded By, (t) = Taken By, (c) = Cosigned By    Initials Name Provider Type    ES Minoo Ledesma, OT Occupational Therapist                 Mobility/ADL's     Row Name 02/09/22 0903          Bed Mobility     Bed Mobility supine-sit; sit-supine  -ES     Supine-Sit [place] (Bed Mobility) supervision  -ES     Sit-Supine [place] (Bed Mobility) supervision  -ES     Row Name 02/09/22 0903          Transfers    Transfers sit-stand transfer  -ES     Sit-Stand [place] (Transfers) supervision  -ES     Row Name 02/09/22 0903          Sit-Stand Transfer    Assistive Device (Sit-Stand Transfers) --  hand held assist x1  -ES     Row Name 02/09/22 0903          Functional Mobility    Functional Mobility- Ind. Level supervision required  -ES     Functional Mobility- Device --  handheld assist x1  -ES     Functional Mobility- Comment Patient performs functional mobility from edge of bed to sinkside and returns to edge of bed. Patient SPV with functional mobility with handheld assist x1.  -ES     Row Name 02/09/22 0903          Activities of Daily Living    BADL Assessment/Intervention bathing; upper body dressing; lower body dressing; grooming; feeding; toileting  -ES     Row Name 02/09/22 0903          Bathing Assessment/Intervention    [place] Level (Bathing) bathing skills  -ES     Row Name 02/09/22 0903          Upper Body Dressing Assessment/Training    [place] Level (Upper Body Dressing) set up  -ES     Row Name 02/09/22 0903          Lower Body Dressing Assessment/Training    [place] Level (Lower Body Dressing) lower body dressing skills; set up  -ES     Row Name 02/09/22 0903          Grooming Assessment/Training    [place] Level (Grooming) grooming skills; set up  -ES     Row Name 02/09/22 0903          Self-Feeding Assessment/Training    [place] Level (Feeding) feeding skills; independent  -ES     Row Name 02/09/22 0903          Toileting Assessment/Training    [place] Level (Toileting) toileting skills; supervision  -ES           User Key  (r) = Recorded By, (t) = Taken By, (c) = Cosigned By    Initials Name Provider Type    ES Minoo Ledesma OT Occupational Therapist                Obj/Interventions     Row Name 02/09/22 0905          Sensory Assessment (Somatosensory)    Sensory Assessment (Somatosensory) UE sensation intact  -ES     Row Name 02/09/22 0905          Vision Assessment/Intervention    Visual Impairment/Limitations WFL  -ES     Row Name 02/09/22 0905          Range of Motion Comprehensive    General Range of Motion no range of motion deficits identified; bilateral upper extremity ROM WNL  -ES     Row Name 02/09/22 0905          Strength Comprehensive (MMT)    General Manual Muscle Testing (MMT) Assessment upper extremity strength deficits identified  -ES     Comment, General Manual Muscle Testing (MMT) Assessment BUEs assessed grossly 3+/5, patient endorses increased UE weakness over last x6 months  -ES     Row Name 02/09/22 0905          Motor Skills    Motor Skills coordination; functional endurance  -ES     Coordination WFL  -ES     Functional Endurance fair minus for ADL engagement  -ES     Row Name 02/09/22 0905          Balance    Balance Assessment sitting dynamic balance; standing dynamic balance  -ES     Dynamic Sitting Balance WFL; unsupported; sitting, edge of bed  -ES     Balance Interventions sitting; standing; sit to stand; dynamic; occupation based/functional task  -ES           User Key  (r) = Recorded By, (t) = Taken By, (c) = Cosigned By    Initials Name Provider Type    ES Minoo Ledesma OT Occupational Therapist               Goals/Plan    No documentation.                Clinical Impression     Row Name 02/09/22 1017 02/09/22 0907       Plan of Care Review    Plan of Care Reviewed With patient  -ES patient  -ES    Progress no change  initial evaluation encounter  -ES no change  initial evaluation encounter  -ES    Outcome Summary Patient presents at or near baseline functional status with minimal functional deficits identified that impact patient independence. Patient with decreased UE strength, provided HEP to address UE strength deficits. Patient with lack  of deficits related to functional balance, mobility, ADL transfers or task completion. No inidcated need for skilled occupational therapy intervention in the acute care setting at this time. Patient educated to remain active with assistance from staff in acute setting to maintain level of independence. OT will sign off at this time.  -ES --    Row Name 02/09/22 1017          Therapy Assessment/Plan (OT)    Criteria for Skilled Therapeutic Interventions Met (OT) no problems identified which require skilled intervention  -ES     Therapy Frequency (OT) evaluation only  -ES     Row Name 02/09/22 1017          Therapy Plan Review/Discharge Plan (OT)    Anticipated Discharge Disposition (OT) home; home with home health  -ES     Row Name 02/09/22 1017          Vital Signs    O2 Delivery Pre Treatment nasal cannula  1L  -ES     O2 Delivery Intra Treatment room air  -ES     O2 Delivery Post Treatment nasal cannula  1L  -ES     Row Name 02/09/22 1017          Positioning and Restraints    Post Treatment Position bed  Patient left lying semisupine in bed at conclusion of therapy session. All needs met. Call button within reach.  -ES           User Key  (r) = Recorded By, (t) = Taken By, (c) = Cosigned By    Initials Name Provider Type    ES Minoo Ledesma, OT Occupational Therapist               Outcome Measures     Row Name 02/09/22 1020          How much help from another is currently needed...    Putting on and taking off regular lower body clothing? 4  -ES     Bathing (including washing, rinsing, and drying) 4  -ES     Toileting (which includes using toilet bed pan or urinal) 4  -ES     Putting on and taking off regular upper body clothing 4  -ES     Taking care of personal grooming (such as brushing teeth) 4  -ES     Eating meals 4  -ES     AM-PAC 6 Clicks Score (OT) 24  -ES     Row Name 02/09/22 1020          Functional Assessment    Outcome Measure Options AM-PAC 6 Clicks Daily Activity (OT); Optimal Instrument  -ES      Row Name 02/09/22 1020          Optimal Instrument    Optimal Instrument Optimal - 3  -ES     Bending/Stooping 1  -ES     Standing 1  -ES     Reaching 1  -ES     From the list, choose the 3 activities you would most like to be able to do without any difficulty Bending/stooping; Standing; Reaching  -ES     Total Score Optimal - 3 3  -ES           User Key  (r) = Recorded By, (t) = Taken By, (c) = Cosigned By    Initials Name Provider Type     Minoo Ledesma OT Occupational Therapist                Occupational Therapy Education                 Title: PT OT SLP Therapies (Done)     Topic: Occupational Therapy (Done)     Point: ADL training (Done)     Description:   Instruct learner(s) on proper safety adaptation and remediation techniques during self care or transfers.   Instruct in proper use of assistive devices.              Learning Progress Summary           Patient Acceptance, E,TB, VU by  at 2/9/2022 1020                   Point: Home exercise program (Done)     Description:   Instruct learner(s) on appropriate technique for monitoring, assisting and/or progressing therapeutic exercises/activities.              Learning Progress Summary           Patient Acceptance, E,TB, VU by  at 2/9/2022 1020                   Point: Precautions (Done)     Description:   Instruct learner(s) on prescribed precautions during self-care and functional transfers.              Learning Progress Summary           Patient Acceptance, E,TB, VU by  at 2/9/2022 1020                   Point: Body mechanics (Done)     Description:   Instruct learner(s) on proper positioning and spine alignment during self-care, functional mobility activities and/or exercises.              Learning Progress Summary           Patient Acceptance, E,TB, VU by  at 2/9/2022 1020                               User Key     Initials Effective Dates Name Provider Type Discipline     09/13/21 -  Minoo Ledesma OT Occupational Therapist OT               OT Recommendation and Plan  Therapy Frequency (OT): evaluation only  Plan of Care Review  Plan of Care Reviewed With: patient  Progress: no change (initial evaluation encounter)  Outcome Summary: Patient presents at or near baseline functional status with minimal functional deficits identified that impact patient independence. Patient with decreased UE strength, provided HEP to address UE strength deficits. Patient with lack of deficits related to functional balance, mobility, ADL transfers or task completion. No inidcated need for skilled occupational therapy intervention in the acute care setting at this time. Patient educated to remain active with assistance from staff in acute setting to maintain level of independence. OT will sign off at this time.     Time Calculation:    Time Calculation- OT     Row Name 02/09/22 1021             Time Calculation- OT    OT Received On 02/09/22  -ES              Untimed Charges    OT Eval/Re-eval Minutes 32  -ES              Total Minutes    Untimed Charges Total Minutes 32  -ES       Total Minutes 32  -ES            User Key  (r) = Recorded By, (t) = Taken By, (c) = Cosigned By    Initials Name Provider Type    ES Minoo Ledesma OT Occupational Therapist              Therapy Charges for Today     Code Description Service Date Service Provider Modifiers Qty    [id number] HC OT EVAL LOW COMPLEXITY 3 2/9/2022 Minoo Ledesma OT GO 1               Minoo Ledesma OT  2/9/2022

## 2022-02-09 NOTE — CASE MANAGEMENT/SOCIAL WORK
"Discharge Planning Assessment   [facility]     Patient Name: Betty Sprague  MRN: [id number]  Today's Date: 2/9/2022    Admit Date: 2/8/2022     Discharge Needs Assessment     Row Name 02/09/22 0818       Living Environment    Lives With alone    Current Living Arrangements home/apartment/condo    Primary Care Provided by self    Family Caregiver if Needed none    Quality of Family Relationships supportive; helpful    Able to Return to Prior Arrangements yes       Resource/Environmental Concerns    Transportation Concerns car, none       Transition Planning    Patient/Family Anticipates Transition to home with help/services    Patient/Family Anticipated Services at Transition none    Transportation Anticipated family or friend will provide       Discharge Needs Assessment    Readmission Within the Last 30 Days previous discharge plan unsuccessful    Equipment Currently Used at Home cane, straight; walker, rolling    Concerns to be Addressed no discharge needs identified; denies needs/concerns at this time    Anticipated Changes Related to Illness none    Equipment Needed After Discharge none    Outpatient/Agency/Support Group Needs homecare agency    Discharge Facility/Level of Care Needs home with home health               Discharge Plan     Row Name 02/09/22 0821       Plan    Plan CM spoke with patient at bedside, Patient d/c from hospital 4 days ago, did not  her medications-\"I had them at home already\", patient has since changed pharmacy states\" they were trying to kill me with the wrong medications- told me that I had heart medications- I don't.\" CM explained some heart medications treat hypertension- she wants MD to explain. Patient is currently on 1L NC at 99 % Patient does not use at home. Patient is very anxious about removing oxygen. RN is in room with patient. She is vaccinated, wants HH at d/c . Patient would like to have flu vaccine while at hospital- RN aware.              Continued Care and " Services - Admitted Since 2/8/2022    Coordination has not been started for this encounter.          Demographic Summary     Row Name 02/09/22 0807       General Information    Admission Type inpatient    Arrived From home    Referral Source admission list    Preferred Language English       Contact Information    Permission Granted to Share Info With family/designee               Functional Status     Row Name 02/09/22 0808       Functional Status    Usual Activity Tolerance good    Current Activity Tolerance good       Functional Status, IADL    Medications independent    Meal Preparation independent    Housekeeping independent    Laundry independent    Shopping independent       Mental Status    General Appearance WDL WDL       Mental Status Summary    Recent Changes in Mental Status/Cognitive Functioning no changes       Employment/    Employment Status disabled               Psychosocial    No documentation.                Abuse/Neglect    No documentation.                Legal     Row Name 02/09/22 0810       Financial/Legal    Source of Income disability    Application for Public Assistance not applied               Substance Abuse    No documentation.                Patient Forms    No documentation.                   Alivia Mckeon RN

## 2022-02-09 NOTE — THERAPY EVALUATION
Acute Care - Physical Therapy Initial Evaluation  RIGOBERTO Bill     Patient Name: Betty Sprague  : 1955  MRN: [id number]       Admit date: 2022     Referring Physician: Stefan Newsome MD     Surgery Date:* No surgery found *          Today's Date: 2022      Visit Dx:     ICD-10-CM ICD-9-CM   1. Hypertensive emergency  I16.1 401.9   2. Decreased activities of daily living (ADL)  Z78.9 V49.89   3. Difficulty in walking  R26.2 719.7     Patient Active Problem List   Diagnosis   • Coronary artery disease involving native coronary artery of native heart without angina pectoris   • Nonischemic dilated cardiomyopathy (HCC)   • Hyperlipemia, mixed   • History of coronary angioplasty with insertion of stent   • Hypertensive urgency   • COPD (chronic obstructive pulmonary disease) ([facility])   • Congestive heart failure (CHF) ([facility])   • Essential hypertension   • Type 2 diabetes mellitus, without long-term current use of insulin ([facility])   • Peripheral vascular disease ([facility])   • Headache   • Hypertensive emergency     Past Medical History:   Diagnosis Date   • Arthritis    • Benign essential hypertension    • Cervicalgia 2018   • CHF (congestive heart failure) ([facility])    • COPD (chronic obstructive pulmonary disease) ([facility])    • DDD (degenerative disc disease), lumbar    • Dental decay 2017   • Diabetes ([facility])    • Essential hypertension 2017   • Heart attack ([facility])    • Hip fracture ([facility])    • HLD (hyperlipidemia) 2017    will update her lipids   • HTN (hypertension)    • Hyperlipemia    • Leg pain    • Leg swelling    • Leg weakness, bilateral 2018   • Lumbago 2018    with low back pain   • Lumbar back pain    • Lumbar herniated disc    • Mild episode of recurrent major depressive disorder (HCC) 2017   • Muscle cramps    • Seasonal allergies    • Spinal stenosis of lumbar region 2017   • Spinal stenosis, lumbosacral region 2018   • Tobacco abuse 2017   • Tobacco abuse  counseling 2017   • Vascular disease, peripheral (HCC)      Past Surgical History:   Procedure Laterality Date   • CARDIAC SURGERY     •  SECTION     • CORONARY ANGIOPLASTY WITH STENT PLACEMENT     • TONSILLECTOMY       PT Assessment (last 12 hours)     PT Evaluation and Treatment     Row Name 22 1155          Physical Therapy Time and Intention    Subjective Information complains of; fatigue  -REJI     Document Type evaluation  -REJI     Mode of Treatment individual therapy; physical therapy  -REJI     Patient Effort good  -REJI     Symptoms Noted During/After Treatment none  -REJI     Row Name 22 1155          General Information    Patient Profile Reviewed yes  -REJI     Patient Observations alert; cooperative; agree to therapy  -REJI     Prior Level of Function independent:; all household mobility; gait; transfer  -REJI     Equipment Currently Used at Home cane, straight  periodically  -REJI     Existing Precautions/Restrictions oxygen therapy device and L/min  -REJI     Row Name 22 1155          Living Environment    Current Living Arrangements home/apartment/condo  -REJI     Home Accessibility stairs to enter home  -REJI     Lives With alone  -REJI     Row Name 22 1155          Home Main Entrance    Number of Stairs, Main Entrance ten  -REJI     Surface of Stairs, Main Entrance carpeting  -REJI     Row Name 22 1155          Stairs Within Home, Primary    Number of Stairs, Within Home, Primary none  -REJI     Row Name 22 1155          Home Use of Assistive/Adaptive Equipment    Equipment Currently Used at Home cane, straight  periodically  -REJI     Row Name 22 1155          Pain    Additional Documentation Pain Scale: Numbers Pre/Post-Treatment (Group)  -REJI     Row Name 22 1155          Pain Scale: Numbers Pre/Post-Treatment    Pretreatment Pain Rating 4/10  -REJI     Posttreatment Pain Rating 4/10  -REJI     Pain Location - Side Bilateral  -REJI     Pain Location - Orientation lower  -REJI      Pain Location calf  -REJI     Pre/Posttreatment Pain Comment Pt reports this pain is familiar to them. Pt describes pain as an ache.  -REJI     Row Name 02/09/22 1155          Range of Motion (ROM)    Range of Motion ROM is [facility]  -[facility] Name 02/09/22 1155          Strength (Manual Muscle Testing)    Strength (Manual Muscle Testing) strength is [facility] Name 02/09/22 1155          Bed Mobility    Bed Mobility bed mobility (all) activities  -REJI     All Activities, [place] (Bed Mobility) supervision  -[facility] Name 02/09/22 1155          Transfers    Transfers sit-stand transfer; stand-sit transfer  -REJI     Sit-Stand [place] (Transfers) supervision  -REJI     Stand-Sit [place] (Transfers) supervision  -[facility] Name 02/09/22 1155          Gait/Stairs (Locomotion)    Gait/Stairs Locomotion gait/ambulation independence; gait/ambulation assistive device  -REJI     [place] Level (Gait) contact guard  -REJI     Assistive Device (Gait) walker, front-wheeled; other (see comments)  pt walks half distance w/ AD and w/o AD  -REJI     Distance in Feet (Gait) 100  -REJI     Row Name 02/09/22 1155          Balance    Balance Assessment standing dynamic balance  -REJI     Dynamic Sitting Balance [facility]  -[facility] Name 02/09/22 1155          Plan of Care Review    Plan of Care Reviewed With patient  -REJI     Outcome Summary At this time, pt demonstrates safety in ambulation and transfers w/n a household distance and is safe to d/c home.  -REJI     Row Name 02/09/22 1155          Vital Signs    Post Systolic BP Rehab 174  -REJI     Post Treatment Diastolic BP 63  -REJI     Pre SpO2 (%) 93  on room air, pt reports they do not use O2 at home  -REJI     O2 Delivery Pre Treatment room air  -REJI     Intra SpO2 (%) 89  -REJI     O2 Delivery Intra Treatment room air  -REJI     Post SpO2 (%) 100  pt recovers from exs on room air independently  -REJI     O2 Delivery Post Treatment room air  -REJI     Recovery Time 15 seconds  -REJI     Andrew  Name 02/09/22 1155          Positioning and Restraints    Pre-Treatment Position in bed  -REJI     Post Treatment Position bed  -REJI     Row Name 02/09/22 1155          Therapy Assessment/Plan (PT)    Criteria for Skilled Interventions Met (PT) no problems identified which require skilled intervention  -REJI           User Key  (r) = Recorded By, (t) = Taken By, (c) = Cosigned By    Initials Name Provider Type    Jonathon Curtis PT Physical Therapist                Physical Therapy Education                 Title: PT OT SLP Therapies (Done)     Topic: Physical Therapy (Done)     Point: Precautions (Done)     Learning Progress Summary           Patient Acceptance, E, VU by REJI at 2/9/2022 1208                               User Key     Initials Effective Dates Name Provider Type Discipline    REJI 06/03/21 -  Jonathon Cabezas PT Physical Therapist PT              PT Recommendation and Plan  Anticipated Discharge Disposition (PT): home  Plan of Care Reviewed With: patient  Outcome Summary: At this time, pt demonstrates safety in ambulation and transfers w/n a household distance and is safe to d/c home.   Outcome Measures     Row Name 02/09/22 1200             How much help from another person do you currently need...    Turning from your back to your side while in flat bed without using bedrails? 4  -REJI      Moving from lying on back to sitting on the side of a flat bed without bedrails? 4  -REJI      Moving to and from a bed to a chair (including a wheelchair)? 4  -REJI      Standing up from a chair using your arms (e.g., wheelchair, bedside chair)? 4  -REJI      Climbing 3-5 steps with a railing? 4  -REJI      To walk in hospital room? 4  -REJI      AM-PAC 6 Clicks Score (PT) 24  -REJI              Functional Assessment    Outcome Measure Options AM-PAC 6 Clicks Basic Mobility (PT)  -REJI            User Key  (r) = Recorded By, (t) = Taken By, (c) = Cosigned By    Initials Name Provider Type    Jonathon Curtis PT Physical  Therapist                 Time Calculation:    PT Charges     Row Name 02/09/22 1156             Time Calculation    PT Received On 02/09/22  -REJI              Untimed Charges    PT Eval/Re-eval Minutes 35  -REJI              Total Minutes    Untimed Charges Total Minutes 35  -REJI       Total Minutes 35  -REJI            User Key  (r) = Recorded By, (t) = Taken By, (c) = Cosigned By    Initials Name Provider Type    Jonathon Curtis, PT Physical Therapist              Therapy Charges for Today     Code Description Service Date Service Provider Modifiers Qty    [id number] HC PT EVAL LOW COMPLEXITY 3 2/9/2022 Jonathon Cabezas, PT GP 1          PT G-Codes  Outcome Measure Options: AM-PAC 6 Clicks Basic Mobility (PT)  AM-PAC 6 Clicks Score (PT): 24  AM-PAC 6 Clicks Score (OT): 24    Jonathon Cabezas PT  2/9/2022

## 2022-02-09 NOTE — PAYOR COMM NOTE
"Shabnam Sprague (66 y.o. Female)             Date of Birth Social Security Number Address Home Phone MRN    1955  [address] [id number] [phone] [id number]    Latter day Marital Status             Unknown        Admission Date Admission Type Admitting Provider Attending Provider Department, Room/Bed    2/8/22 Emergency Stefan Newsome MD Oberst, Eric, MD [facility], 202/1    Discharge Date Discharge Disposition Discharge Destination                         Attending Provider: Stefan Newsome MD    Allergies: Latex    Isolation: None   Infection: None   Code Status: CPR   Advance Care Planning Activity    Ht: 162.6 cm (64\")   Wt: 100 kg (220 lb 7.4 oz)    Admission Cmt: None   Principal Problem: None                Active Insurance as of 2/8/2022     Primary Coverage     Payor Plan Insurance Group Employer/Plan Group    [facility]AC[facility] [id number]     Payor Plan Address Payor Plan Phone Number Payor Plan Fax Number Effective Dates    [address] [address]   3/1/2021 - None Entered    [facility] [id number]       Subscriber Name Subscriber Birth Date Member ID       SHABNAM SPRAGUE 1955 [id number]                 Emergency Contacts      (Rel.) Home Phone Work Phone Mobile Phone    Domonique Gusman (Daughter) -- -- [phone]        #[id number] PENDED CASE      CONTACT   JULIA S UTILIZATION REVIEW    [address] [place] [id number]  TAX ID [id number]  [facility]  [phone]       [phone]   [phone]        Hypertension - Clinical Indications for Admission to Inpatient Care by Faiza Posada, RN         Met: Reviewed on 2/9/2022 by Faiza Posada, RN       Created Using Review Status Review Entered   [employer]® Completed 2/9/2022 12:54       Criteria Set Name - Subset   Hypertension - Clinical Indications for Admission to Inpatient Care      Criteria Review "      Clinical Indications for Admission to Inpatient Care    Most Recent : Faiza Posada Most Recent Date: 2/9/2022 12:54:40 EST    (X) Admission is indicated for  1 or more  of the following :       (X) Hypertensive emergency indicated by SBP greater than 180 mm Hg or DBP greater than 120 mm       Hg with evidence of acute or worsening target organ damage, as indicated by  1 or more        of the following  (1) (2):          (X) Other acute or worsening end organ damage due to severe hypertension          2/9/2022 12:54:41 EST by Faiza Posada            Acute hypoxic respiratory failure, requiring 2 L nasal cannula, dyspneic on arrival.  Given concern for flash pulmonary edema, if needed will start diuretics    Notes:    2/9/2022 12:54:41 EST by Faiza Posada    Subject: Admission     Patient found to be significantly hypertensive with a systolic blood pressure of 244 on arrival. In the emergency department patient received clonidine, hydralazine, labetalol with improvement in blood pressure. On my exam patient with a systolic blood pressure of 140 and feeling well.        2/9/2022 12:54:41 EST by Faiza Posada    Subject: Admission    retro review  2/8/22.              Hypertension - Care Day 1 (2/8/2022) by Faiza Posada, RN         Met: Reviewed on 2/9/2022 by Faiza Posada, RN       Created Using Review Status Review Entered   [employer]® Completed 2/9/2022 12:58       Criteria Set Name - Subset   Hypertension - Care Day 1      Criteria Review      Care Day: 1 Care Date: 2/8/2022 Level of Care:    Guideline Day 2    Level Of Care    ( ) Possible floor    2/9/2022 12:58:57 EST by Faiza Posada      PCU    Clinical Status    (X) * Renal function at baseline or improved    (X) * Mental status at baseline or improved    (X) * Pulmonary edema absent or improved    (X) * Blood pressure improved    Activity    (X) * Increasing activity tolerated    Routes    (X) Oral hydration    (X) Parenteral or oral medications    (X) Oral diet     Interventions    (X) Possible cardiac monitoring    Medications    (X) Transition to oral antihypertensives    * Milestone            Emergency Department Notes      Alma Rudd MD at 02/08/22 0648          Time: 6:48 AM EST  Arrived by: ambulance  Chief Complaint: SOB  History provided by: pt  History is limited by: N/A     History of Present Illness:    Betty Sprague is a 66 y.o. female who presents to the emergency department today with complaints of shortness of breath since 0300 this morning. Pt states she knows she is short of breath because her blood pressure is too high again. Pt was seen in this ED four days ago for her blood pressure where she was admitted and kept for three days due to hypertensive urgency. Upon arrival she has a BP of 244/114. She denies nausea, emesis, cough, chest pain, abdominal pain, fever, or any other pertinent sx.         History provided by:  Patient   used: No      Patient Care Team  Primary Care Provider: Maico Young MD    Past Medical History:     Allergies   Allergen Reactions   • Latex Hives     Past Medical History:   Diagnosis Date   • Arthritis    • Benign essential hypertension    • Cervicalgia 11/01/2018   • CHF (congestive heart failure) ([facility])    • COPD (chronic obstructive pulmonary disease) ([facility])    • DDD (degenerative disc disease), lumbar    • Dental decay 04/27/2017   • Diabetes ([facility])    • Essential hypertension 04/27/2017   • Heart attack ([facility])    • Hip fracture ([facility])    • HLD (hyperlipidemia) 04/27/2017    will update her lipids   • HTN (hypertension)    • Hyperlipemia    • Leg pain    • Leg swelling    • Leg weakness, bilateral 11/01/2018   • Lumbago 11/01/2018    with low back pain   • Lumbar back pain    • Lumbar herniated disc    • Mild episode of recurrent major depressive disorder (HCC) 04/27/2017   • Muscle cramps    • Seasonal allergies    • Spinal stenosis of lumbar region 04/27/2017   • Spinal stenosis, lumbosacral  region 2018   • Tobacco abuse 2017   • Tobacco abuse counseling 2017   • Vascular disease, peripheral (HCC)      Past Surgical History:   Procedure Laterality Date   • CARDIAC SURGERY     •  SECTION     • CORONARY ANGIOPLASTY WITH STENT PLACEMENT     • TONSILLECTOMY       Family History   Problem Relation Age of Onset   • Heart disease Father    • Heart disease Brother    • Cancer Brother         unspecified   • Arthritis Brother        Home Medications:  Prior to Admission medications    Medication Sig Start Date End Date Taking? Authorizing Provider   ALPRAZolam (XANAX) 1 MG tablet Take 1 mg by mouth 4 (Four) Times a Day. 21   Agn Mercado MD   aspirin 81 MG chewable tablet Chew 81 mg Daily. Chew and swallow. 21   Ang Mrecado MD   atorvastatin (LIPITOR) 40 MG tablet Take 40 mg by mouth Daily. 21   Ang Mercado MD   Brilinta 90 MG tablet tablet Take 90 mg by mouth 2 (Two) Times a Day. 21   Ang Mercado MD   carvedilol (COREG) 25 MG tablet Take 1 tablet by mouth 2 (Two) Times a Day With Meals. 22   Guadalupe Bernal MD   citalopram (CeleXA) 10 MG tablet citalopram 10 mg oral tablet take 1 tablet (10 mg) by oral route once daily   Suspended    Ang Mercado MD   ezetimibe (ZETIA) 10 MG tablet TAKE 1 TABLET BY MOUTH EVERY DAY 21   Selena Lynn APRN   isosorbide mononitrate (IMDUR) 30 MG 24 hr tablet Take 30 mg by mouth Daily.    Ang Mercado MD   Levemir FlexTouch 100 UNIT/ML injection INJECT 25 UNITS UNDER THE SKIN TWICE DAILY 21   Ang Mercado MD   losartan (COZAAR) 100 MG tablet Take 100 mg by mouth Daily. 6/3/21   Ang Mercado MD   multivitamin with minerals tablet tablet Take 1 tablet by mouth Daily.    Ang Mercado MD   Ozempic, 0.25 or 0.5 MG/DOSE, 2 MG/1.5ML solution pen-injector INJECT 0.5MG UNDER THE SKIN EVERY WEEK 21   Ang Mercado MD  "  pioglitazone (ACTOS) 30 MG tablet Take 30 mg by mouth Daily.    ProviderAng MD   TiZANidine (ZANAFLEX) 4 MG capsule TAKE ONE CAPSULE BY MOUTH FOUR TIMES DAILY AS NEEDED 4/8/21   Ang Mercado MD   venlafaxine XR (EFFEXOR-XR) 150 MG 24 hr capsule Take 150 mg by mouth Daily.    ProviderAng MD        Social History:   Social History     Tobacco Use   • Smoking status: Current Every Day Smoker     Packs/day: 0.50     Types: Cigarettes   • Smokeless tobacco: Never Used   Vaping Use   • Vaping Use: Never used   Substance Use Topics   • Alcohol use: Never   • Drug use: Never     Recent travel: no     Review of Systems:  Review of Systems   Constitutional: Negative for chills and fever.   HENT: Negative for congestion, rhinorrhea and sore throat.    Eyes: Negative for pain and visual disturbance.   Respiratory: Negative for apnea, cough, chest tightness and shortness of breath.    Cardiovascular: Negative for chest pain and palpitations.   Gastrointestinal: Negative for abdominal pain, diarrhea, nausea and vomiting.   Genitourinary: Negative for difficulty urinating and dysuria.   Musculoskeletal: Negative for joint swelling and myalgias.   Skin: Negative for color change.   Neurological: Negative for seizures and headaches.   Psychiatric/Behavioral: Negative.    All other systems reviewed and are negative.       Physical Exam:  /97 (BP Location: Right arm)   Pulse 106   Temp 97.8 °F (36.6 °C) (Oral)   Resp 24   Ht 162.6 cm (64\")   Wt 104 kg (230 lb 6.1 oz)   SpO2 97%   BMI 39.54 kg/m²     Physical Exam  Vitals and nursing note reviewed.   Constitutional:       General: She is not in acute distress.     Appearance: Normal appearance. She is not toxic-appearing.   HENT:      Head: Normocephalic and atraumatic.      Jaw: There is normal jaw occlusion.   Eyes:      General: Lids are normal.      Extraocular Movements: Extraocular movements intact.      Conjunctiva/sclera: Conjunctivae " normal.      Pupils: Pupils are equal, round, and reactive to light.   Cardiovascular:      Rate and Rhythm: Normal rate and regular rhythm.      Pulses: Normal pulses.      Heart sounds: Normal heart sounds.   Pulmonary:      Effort: Pulmonary effort is normal. No respiratory distress.      Breath sounds: Rales (mild) present. No wheezing or rhonchi.   Abdominal:      General: Abdomen is flat.      Palpations: Abdomen is soft.      Tenderness: There is no abdominal tenderness. There is no guarding or rebound.   Musculoskeletal:         General: Normal range of motion.      Cervical back: Normal range of motion and neck supple.      Right lower leg: No edema.      Left lower leg: No edema.   Skin:     General: Skin is warm and dry.   Neurological:      Mental Status: She is alert and oriented to person, place, and time. Mental status is at baseline.   Psychiatric:         Mood and Affect: Mood normal.                Medications in the Emergency Department:  Medications   aspirin chewable tablet 81 mg (has no administration in time range)   atorvastatin (LIPITOR) tablet 40 mg (has no administration in time range)   ticagrelor (BRILINTA) tablet 90 mg (has no administration in time range)   carvedilol (COREG) tablet 25 mg (has no administration in time range)   isosorbide mononitrate (IMDUR) 24 hr tablet 30 mg (has no administration in time range)   losartan (COZAAR) tablet 100 mg (has no administration in time range)   venlafaxine XR (EFFEXOR-XR) 24 hr capsule 150 mg (has no administration in time range)   insulin detemir (LEVEMIR) injection 20 Units (has no administration in time range)   sodium chloride 0.9 % flush 10 mL (has no administration in time range)   sodium chloride 0.9 % flush 10 mL (has no administration in time range)   acetaminophen (TYLENOL) tablet 650 mg (has no administration in time range)   calcium carbonate (TUMS) chewable tablet 500 mg (200 mg elemental) (has no administration in time range)    ondansetron (ZOFRAN) tablet 4 mg (has no administration in time range)   Pharmacy to Dose enoxaparin (LOVENOX) (has no administration in time range)   dextrose (GLUTOSE) oral gel 15 g (has no administration in time range)   dextrose 10 % infusion (has no administration in time range)   glucagon (human recombinant) (GLUCAGEN DIAGNOSTIC) injection 1 mg (has no administration in time range)   insulin lispro (humaLOG) injection 0-7 Units (has no administration in time range)   metoprolol tartrate (LOPRESSOR) injection 5 mg (has no administration in time range)   enoxaparin (LOVENOX) syringe 40 mg (has no administration in time range)   ALPRAZolam (XANAX) tablet 1 mg (has no administration in time range)   hydrALAZINE (APRESOLINE) injection 20 mg (20 mg Intravenous Given 2/8/22 0732)   cloNIDine (CATAPRES) tablet 0.1 mg (0.1 mg Oral Given 2/8/22 0732)   LORazepam (ATIVAN) injection 1 mg (1 mg Intravenous Given 2/8/22 0733)   labetalol (NORMODYNE,TRANDATE) injection 20 mg (20 mg Intravenous Given 2/8/22 0950)        Labs  Lab Results (last 24 hours)     Procedure Component Value Units Date/Time    CBC & Differential [[id number]]  (Abnormal) Collected: 02/08/22 0657    Specimen: Blood Updated: 02/08/22 0716    Narrative:      The following orders were created for panel order CBC & Differential.  Procedure                               Abnormality         Status                     ---------                               -----------         ------                     CBC Auto Differential[[id number]]        Abnormal            Final result                 Please view results for these tests on the individual orders.    Comprehensive Metabolic Panel [[id number]]  (Abnormal) Collected: 02/08/22 0657    Specimen: Blood Updated: 02/08/22 0739     Glucose 145 mg/dL      BUN 18 mg/dL      Creatinine 0.94 mg/dL      Sodium 141 mmol/L      Potassium 4.1 mmol/L      Chloride 101 mmol/L      CO2 32.1 mmol/L      Calcium 9.6 mg/dL       Total Protein 7.8 g/dL      Albumin 4.40 g/dL      ALT (SGPT) 18 U/L      AST (SGOT) 22 U/L      Alkaline Phosphatase 83 U/L      Total Bilirubin 0.5 mg/dL      eGFR Non African Amer 60 mL/min/1.73      Globulin 3.4 gm/dL      A/G Ratio 1.3 g/dL      BUN/Creatinine Ratio 19.1     Anion Gap 7.9 mmol/L     Narrative:      GFR Normal >60  Chronic Kidney Disease <60  Kidney Failure <15      BNP [[id number]]  (Normal) Collected: 02/08/22 0657    Specimen: Blood Updated: 02/08/22 0736     proBNP 526.5 pg/mL     Narrative:      Among patients with dyspnea, NT-proBNP is highly sensitive for the detection of acute congestive heart failure. In addition NT-proBNP of <300 pg/ml effectively rules out acute congestive heart failure with 99% negative predictive value.    Results may be falsely decreased if patient taking Biotin.      Troponin [[id number]]  (Normal) Collected: 02/08/22 0657    Specimen: Blood Updated: 02/08/22 0736     Troponin T <0.010 ng/mL     Narrative:      Troponin T Reference Range:  <= 0.03 ng/mL-   Negative for AMI  >0.03 ng/mL-     Abnormal for myocardial necrosis.  Clinicians would have to utilize clinical acumen, EKG, Troponin and serial changes to determine if it is an Acute Myocardial Infarction or myocardial injury due to an underlying chronic condition.       Results may be falsely decreased if patient taking Biotin.      CBC Auto Differential [[id number]]  (Abnormal) Collected: 02/08/22 0657    Specimen: Blood Updated: 02/08/22 0716     WBC 10.41 10*3/mm3      RBC 4.20 10*6/mm3      Hemoglobin 12.6 g/dL      Hematocrit 38.6 %      MCV 91.9 fL      MCH 30.0 pg      MCHC 32.6 g/dL      RDW 13.5 %      RDW-SD 45.7 fl      MPV 9.4 fL      Platelets 178 10*3/mm3      Neutrophil % 80.9 %      Lymphocyte % 9.9 %      Monocyte % 5.9 %      Eosinophil % 2.4 %      Basophil % 0.4 %      Immature Grans % 0.5 %      Neutrophils, Absolute 8.43 10*3/mm3      Lymphocytes, Absolute 1.03 10*3/mm3      Monocytes,  Absolute 0.61 10*3/mm3      Eosinophils, Absolute 0.25 10*3/mm3      Basophils, Absolute 0.04 10*3/mm3      Immature Grans, Absolute 0.05 10*3/mm3      nRBC 0.0 /100 WBC     Troponin [[id number]]  (Normal) Collected: 02/08/22 0657    Specimen: Blood Updated: 02/08/22 1044     Troponin T <0.010 ng/mL     Narrative:      Troponin T Reference Range:  <= 0.03 ng/mL-   Negative for AMI  >0.03 ng/mL-     Abnormal for myocardial necrosis.  Clinicians would have to utilize clinical acumen, EKG, Troponin and serial changes to determine if it is an Acute Myocardial Infarction or myocardial injury due to an underlying chronic condition.       Results may be falsely decreased if patient taking Biotin.      Urinalysis With Culture If Indicated - Urine, Clean Catch [[id number]]  (Abnormal) Collected: 02/08/22 1100    Specimen: Urine, Clean Catch Updated: 02/08/22 1129     Color, UA Yellow     Appearance, UA Clear     pH, UA 7.5     Specific Gravity, UA 1.007     Glucose, UA Negative     Ketones, UA Negative     Bilirubin, UA Negative     Blood, UA Trace     Protein, UA 30 mg/dL (1+)     Leuk Esterase, UA Negative     Nitrite, UA Negative     Urobilinogen, UA 0.2 E.U./dL    Urinalysis, Microscopic Only - Urine, Clean Catch [[id number]]  (Abnormal) Collected: 02/08/22 1100    Specimen: Urine, Clean Catch Updated: 02/08/22 1129     RBC, UA 3-5 /HPF      WBC, UA 0-2 /HPF      Bacteria, UA None Seen /HPF      Squamous Epithelial Cells, UA 0-2 /HPF      Hyaline Casts, UA None Seen /LPF      Methodology Automated Microscopy    COVID-19,APTIMA PANTHER(CARISSA),[facility], NP/OP SWAB IN UTM/VTM/SALINE TRANSPORT MEDIA,24 HR TAT - Swab, Nasopharynx [[id number]] Collected: 02/08/22 1101    Specimen: Swab from Nasopharynx Updated: 02/08/22 1120    Troponin [[id number]]  (Normal) Collected: 02/08/22 1326    Specimen: Blood from Arm, Right Updated: 02/08/22 1350     Troponin T <0.010 ng/mL     Narrative:      Troponin T Reference Range:  <= 0.03  ng/mL-   Negative for AMI  >0.03 ng/mL-     Abnormal for myocardial necrosis.  Clinicians would have to utilize clinical acumen, EKG, Troponin and serial changes to determine if it is an Acute Myocardial Infarction or myocardial injury due to an underlying chronic condition.       Results may be falsely decreased if patient taking Biotin.             Imaging:  XR Chest 1 View    Result Date: 2/8/2022  PROCEDURE: XR CHEST 1 VW  COMPARISON: [facility], , CHEST AP/PA 1 VIEW, 3/17/2021, 14:35.  INDICATIONS: SOA Triage Protocol, high BP  FINDINGS:  No acute infiltrate or effusion is identified.  Cardiac and mediastinal silhouettes appear normal.  Increased interstitial markings are noted in the lower lung fields bilaterally, suspected to be related to chronic interstitial lung disease such as from a history of smoking.        1. No acute infiltrate       Adams North M.D.       Electronically Signed and Approved By: Adams North M.D. on 2/08/2022 at 8:42               Procedures:  Procedures    EKG:    Rhythm: sinus  Rate: 93  Intervals: normal  T-wave: normal  ST Segment: normal    EKG Comparison: unchanged    Interpreted by me    Progress                            Medical Decision Making:  MDM  Number of Diagnoses or Management Options  Hypertensive emergency  Diagnosis management comments: The patient´s CBC was reviewed and shows no abnormalities of critical concern. Of note, there is no anemia requiring a blood transfusion and the platelet count is acceptable.  The patient´s CMP was reviewed and shows no abnormalities of critical concern. Of note, the patient´s sodium and potassium are acceptable. The patient´s liver enzymes are unremarkable. The patient´s renal function (creatinine) is preserved. The patient has a normal anion gap.  Urinalysis is negative for bacteriuria.  Chest x-ray is negative for pneumonia.  Patient was given hydralazine, clonidine and labetalol IV in the emergency department.   Case was discussed with Dr. Harvey who agrees with admission.    Total Critical Care time of 40 minutes. Total critical care time documented does not include time spent on separately billed procedures for services of nurses or physician assistants. I personally saw and examined the patient. I have reviewed all diagnostic interpretations and treatment plans as written. I was present for the key portions of any procedures performed and the inclusive time noted in any critical care statement. Critical care time includes patient management by me, time spent at the patients bedside,  time to review lab and imaging results, discussing patient care, documentation in the medical record, and time spent with family or caregiver.       Amount and/or Complexity of Data Reviewed  Clinical lab tests: reviewed  Tests in the radiology section of CPT®: reviewed  Tests in the medicine section of CPT®: reviewed  Discussion of test results with the performing providers: yes  Discuss the patient with other providers: yes  Independent visualization of images, tracings, or specimens: yes    Risk of Complications, Morbidity, and/or Mortality  Presenting problems: moderate  Management options: moderate    Patient Progress  Patient progress: stable       Final diagnoses:   Hypertensive emergency        Disposition:  ED Disposition     ED Disposition Condition Comment    Decision to Admit  Level of Care: Telemetry [5]   Diagnosis: Hypertensive emergency [547557]   Admitting Physician: TREVIN DENNIS [[id number]]   Attending Physician: TREVIN DENNIS [[id number]]   Isolate for COVID?: Yes [1]   Certification: I Certify That Inpatient Hospital Services Are Medically Necessary For Greater Than 2 Midnights            This medical record created using voice recognition software and may contain unintended errors.    Documentation assistance provided by Margarita Ennis acting as scribe for Alma Rudd MD Information recorded by the scribe was done at my  direction and has been verified and validated by me.          Margarita Ennis  22 0652       Margarita Ennis  22 0656       Alma Rudd MD  22      Electronically signed by Alma Rudd MD at 22       Stefan Newsome MD   Physician   Hospitalist   H&P       Signed   Date of Service:  22   Creation Time:  22              Signed        Expand All Collapse All        Show:Clear all  [x]Manual[x]Template[]Copied    Added by:  [x]Stefan Newsome MD      []Garrett for details     [facility]  [facility]   Patient Name: Betty Sprague  : 1955  MRN: [id number]  Primary Care Physician:  Maico Young MD  Date of admission: 2022        Subjective []Expand by Default     Subjective      Chief Complaint:   Shortness of breath     HPI:     Betty Sprague is a 66 y.o. female with a past medical history significant for CHF, COPD, diabetes, hypertension, hyperlipidemia, and recent admission for hypertensive urgency.  Patient presents emergency department just 2 days after her last discharge with complaints of not feeling well at home.  Patient endorses weakness and shortness of breath.  In the emergency department patient requiring 2 L to maintain saturations greater than 90%, complaining of dyspnea.  Patient found to be significantly hypertensive with a systolic blood pressure of 244 on arrival.  In the emergency department patient received clonidine, hydralazine, labetalol with improvement in blood pressure.  On my exam patient with a systolic blood pressure of 140 and feeling well.  Patient denies any fevers or chills.  No nausea no vomiting no diarrhea no constipation.  Patient endorses no recent sick contacts.  States since she has been home has been taking her home medications.  However is unaware of any of the names.  Patient with no significant lab abnormalities in the ED.  Chest x-ray per my read with no  acute abnormalities.  Covid test pending        Personal History      Past Medical History:  Hypertension  COPD  Diabetes  Hyperlipidemia  Heart failure with reduced ejection fraction, EF 45%     Past Surgical History:  Cardiac catheterizations    Tonsillectomy     Family History:   Heart disease in brothers and father     Social History:   Patient is a current everyday smoker of half pack per day  Denies alcohol use  Denies illicit drug use     Home Medications:  ALPRAZolam, Semaglutide(0.25 or 0.5MG/DOS), TiZANidine, albuterol sulfate HFA, aspirin, atorvastatin, carvedilol, citalopram, ezetimibe, insulin detemir, isosorbide mononitrate, levocetirizine, losartan, multivitamin with minerals, pioglitazone, ticagrelor, and venlafaxine XR     Allergies:       Allergies   Allergen Reactions   • Latex Hives         Review of Systems              All systems were reviewed and negative except for: Dyspnea, weakness           Objective      Objective      Vitals:   Temp:  [97.8 °F (36.6 °C)] 97.8 °F (36.6 °C)  Heart Rate:  [] 106  Resp:  [24-28] 24  BP: (174-244)/() 174/97  Flow (L/min):  [2] 2     Physical Exam                         Constitutional: Awake, alert, resting comfortably in bed, breathing comfortably with nasal cannula in place              Eyes: Pupils equal, sclerae anicteric, no conjunctival injection              HENT: NCAT, mucous membranes moist              Neck: Supple, no thyromegaly, no lymphadenopathy, trachea midline              Respiratory: Diminished bibasilar, no wheezing no rhonchi no crackles              Cardiovascular: RRR, no murmurs, rubs, or gallops, palpable pedal pulses bilaterally              Gastrointestinal: Positive bowel sounds, soft, nontender, nondistended              Musculoskeletal: No bilateral ankle edema, no clubbing or cyanosis to extremities              Psychiatric: Appropriate affect, cooperative              Neurologic: Oriented x 3, strength  symmetric in all extremities, Cranial Nerves grossly intact to confrontation, speech clear              Skin: No rashes            Result Review       Result Review:  I have personally reviewed the results from the time of this admission to 2/8/2022 15:50 EST and agree with these findings:  [x]?  Laboratory  [x]?  Microbiology  [x]?  Radiology  [x]?  EKG/Telemetry   [x]?  Cardiology/Vascular   []?  Pathology  [x]?  Old records  []?  Other:              Assessment/Plan      Assessment / Plan      Assessment/Plan:   Hypertensive urgency  Acute hypoxic respiratory failure, requiring 2 L nasal cannula, dyspneic on arrival  History of hypertension  Weakness  COPD not exacerbation  Heart failure with reduced ejection fraction, not in exacerbation  Hyperlipidemia  Diabetes     Plan:  Patient to be admitted to hospital for further care management  Patient started on medications in the ED with improvement in blood pressure  Ordering patient's home meds now, patient's blood pressures controlled on this regiment on her recent hospitalization  As needed IV medications are available for persistent hypertension  Covid testing given patient's hypoxia, this is returned negative  Continue to trend troponins  Given concern for flash pulmonary edema, if needed will start diuretics  Resuming other home medications as indicated  PT and OT evaluation, patient likely need SNF discharge     DVT prophylaxis:  Medical DVT prophylaxis orders are present.     CODE STATUS:    Code Status (Patient has no pulse and is not breathing): CPR (Attempt to Resuscitate)  Medical Interventions (Patient has pulse or is breathing): Full Support        Admission Status:  I believe this patient meets inpatient status.     Electronically signed by Stefan Newsome MD, 02/08/22, 3:50 PM EST.                                      Routing History                    Stefan Newsome MD   Physician   Hospitalist   Progress Notes       Signed   Date of Service:  02/09/22  1119   Creation Time:  22 1119              Signed        Expand All Collapse All        Show:Clear all  [x]Manual[x]Template[x]Copied    Added by:  [x]Stefan Newsome MD      []Garrett for details     [facility]  Date: 2022  Patient Name: Betty Sprague  : 1955  MRN: [id number]  Date of admission: 2022           Subjective []Expand by Default     Subjective      Chief Complaint:   Shortness of breath     Summary:   Betty Sprague is a 66 y.o. female with a past medical history significant for CHF, COPD, diabetes, hypertension, hyperlipidemia, and recent admission for hypertensive urgency.  Patient presents emergency department just 2 days after her last discharge with complaints of not feeling well at home.  Patient endorses weakness and shortness of breath.  In the emergency department patient requiring 2 L to maintain saturations greater than 90%, complaining of dyspnea.  Patient found to be significantly hypertensive with a systolic blood pressure of 244 on arrival.  In the emergency department patient received clonidine, hydralazine, labetalol with improvement in blood pressure.  On my exam patient with a systolic blood pressure of 140 and feeling well.  Patient denies any fevers or chills.  No nausea no vomiting no diarrhea no constipation.  Patient endorses no recent sick contacts.  States since she has been home has been taking her home medications.  However is unaware of any of the names.  Patient with no significant lab abnormalities in the ED.  Chest x-ray per my read with no acute abnormalities.  Covid test returned negative     Interval Followup:   After resumption of patient's home medications blood pressure much better this morning.  We will continue to monitor through the day, if needed will uptitrate.  Patient stated that recently her pharmacy has been changing her medications without consent from her physicians, stating that medications were changed  between generics and brand names frequently.  Patient also claims that despite taking these medications at home her blood pressure will inevitably shoot up through the day.  Therefore we will continue to monitor.  Patient was still on nasal cannula this morning on rounds, will wean off, patient saturating greater than 96% does not need additional oxygen.  PT and OT to evaluate patient for SNF versus home with home health      Review of Systems              All systems were reviewed and negative except for: Continued weakness           Objective      Objective      Vitals:   Temp:  [97.9 °F (36.6 °C)-98.4 °F (36.9 °C)] 97.9 °F (36.6 °C)  Heart Rate:  [65-76] 73  Resp:  [16-24] 16  BP: (130-209)/(60-97) 156/67  Flow (L/min):  [2] 2  Physical Exam                         Constitutional: Awake, alert, resting comfortably in bed, breathing comfortably with nasal cannula in place              Eyes: Pupils equal, sclerae anicteric, no conjunctival injection              HENT: NCAT, mucous membranes moist              Neck: Supple, no thyromegaly, no lymphadenopathy, trachea midline              Respiratory: Diminished bibasilar, no wheezing no rhonchi no crackles              Cardiovascular: RRR, no murmurs, rubs, or gallops, palpable pedal pulses bilaterally              Gastrointestinal: Positive bowel sounds, soft, nontender, nondistended              Musculoskeletal: No bilateral ankle edema, no clubbing or cyanosis to extremities              Psychiatric: Appropriate affect, cooperative              Neurologic: Oriented x 3, strength symmetric in all extremities, Cranial Nerves grossly intact to confrontation, speech clear              Skin: No rashes            Result Review       Result Review:  I have personally reviewed the results from the time of this admission to 2/9/2022 11:21 EST and agree with these findings:  [x]?  Laboratory  [x]?  Microbiology  [x]?  Radiology  [x]?  EKG/Telemetry   []?   Cardiology/Vascular   []?  Pathology  []?  Old records  []?  Other:           Assessment/Plan      Assessment / Plan      Assessment/Plan:  Hypertensive urgency   Acute hypoxic respiratory failure, requiring 2 L nasal cannula, dyspneic on arrival  History of hypertension  Weakness  COPD not exacerbation  Heart failure with reduced ejection fraction, not in exacerbation  Hyperlipidemia  Diabetes     Plan:  Patient remains admitted for further care management  Allowing resumption of patient's home medications, blood pressure improved, continue to titrate and monitor  As needed IV medications are available for persistent hypertension  Covid testing negative  Wean off of oxygen  Opponents all negative  No need for diuretics, continue to monitor  Resuming other home medications as indicated  PT and OT evaluation, evaluation for SNF versus home with home health     Discussed plan with RN,      DVT prophylaxis:  Medical DVT prophylaxis orders are present.     CODE STATUS:   Code Status (Patient has no pulse and is not breathing): CPR (Attempt to Resuscitate)  Medical Interventions (Patient has pulse or is breathing): Full Support     Electronically signed by Stefan Newsome MD, 02/09/22, 11:21 AM EST.     CBC Results         CBC    CBC 2/6/22 2/8/22 2/9/22   WBC 7.09 10.41 7.98   RBC 3.49 (A) 4.20 3.58 (A)   Hemoglobin 10.6 (A) 12.6 10.6 (A)   Hematocrit 32.0 (A) 38.6 32.8 (A)   MCV 91.7 91.9 91.6   MCH 30.4 30.0 29.6   MCHC 33.1 32.6 32.3   RDW 13.5 13.5 13.3   Platelets 154 178 148   (A) Abnormal value                     CMP Results:         CMP    CMP 2/6/22 2/8/22 2/9/22   Glucose 139 (A) 145 (A) 125 (A)   BUN 22 18 24 (A)   Creatinine 1.03 (A) 0.94 1.01 (A)   eGFR Non African Am 54 (A) 60 (A) 55 (A)   Sodium 138 141 138   Potassium 4.0 4.1 3.9   Chloride 102 101 102   Calcium 8.8 9.6 9.1   Albumin 3.40 (A) 4.40     Total Bilirubin 0.4 0.5     Alkaline Phosphatase 62 83     AST (SGOT) 13 22     ALT (SGPT) 8  18     (A) Abnormal value

## 2022-02-10 NOTE — PROGRESS NOTES
[facility]   Hospitalist Progress Note  Date: 2/10/2022  Patient Name: Betty Sprague  : 1955  MRN: [id number]  Date of admission: 2022      Subjective   Subjective     Chief Complaint:   Shortness of breath    Summary:   Betty Sprague is a 66 y.o. female with a past medical history significant for CHF, COPD, diabetes, hypertension, hyperlipidemia, and recent admission for hypertensive urgency.  Patient presents emergency department just 2 days after her last discharge with complaints of not feeling well at home.  Patient endorses weakness and shortness of breath.  In the emergency department patient requiring 2 L to maintain saturations greater than 90%, complaining of dyspnea.  Patient found to be significantly hypertensive with a systolic blood pressure of 244 on arrival.  In the emergency department patient received clonidine, hydralazine, labetalol with improvement in blood pressure.  On my exam patient with a systolic blood pressure of 140 and feeling well.  Patient denies any fevers or chills.  No nausea no vomiting no diarrhea no constipation.  Patient endorses no recent sick contacts.  States since she has been home has been taking her home medications.  However is unaware of any of the names.  Patient with no significant lab abnormalities in the ED.  Chest x-ray per my read with no acute abnormalities.  Covid test returned negative    Interval Followup:   For the most part patient's blood pressure has been significantly controlled.  Patient did have one episode of hypertension overnight, likely contributed to pain and anxiety.  However starting amlodipine today following discussion with her cardiologist.  Anticipate discharging patient tomorrow.    Review of Systems   All systems were reviewed and negative except for: Continued weakness, complaining of right hip pain    Objective   Objective     Vitals:   Temp:  [97.3 °F (36.3 °C)-98.9 °F (37.2 °C)] 98.3 °F (36.8 °C)  Heart Rate:  [71-75]  73  Resp:  [16-18] 16  BP: (156-189)/() 156/93  Flow (L/min):  [1] 1  Physical Exam               Constitutional: Awake, alert, resting comfortably in bed, breathing comfortably on room air              Eyes: Pupils equal, sclerae anicteric, no conjunctival injection              HENT: NCAT, mucous membranes moist              Neck: Supple, no thyromegaly, no lymphadenopathy, trachea midline              Respiratory: Diminished bibasilar, no wheezing no rhonchi no crackles              Cardiovascular: RRR, no murmurs, rubs, or gallops, palpable pedal pulses bilaterally              Gastrointestinal: Positive bowel sounds, soft, nontender, nondistended              Musculoskeletal: No bilateral ankle edema, no clubbing or cyanosis to extremities              Psychiatric: Appropriate affect, cooperative              Neurologic: Oriented x 3, strength symmetric in all extremities, Cranial Nerves grossly intact to confrontation, speech clear              Skin: No rashes     Result Review    Result Review:  I have personally reviewed the results from the time of this admission to 2/10/2022 15:55 EST and agree with these findings:  [x]  Laboratory  [x]  Microbiology  [x]  Radiology  [x]  EKG/Telemetry   []  Cardiology/Vascular   []  Pathology  []  Old records  []  Other:    Assessment/Plan   Assessment / Plan     Assessment/Plan:  Hypertensive urgency   Acute hypoxic respiratory failure, requiring 2 L nasal cannula, dyspneic on arrival  History of hypertension  Weakness  COPD not exacerbation  Heart failure with reduced ejection fraction, not in exacerbation  Hyperlipidemia  Diabetes     Plan:  Patient remains admitted for further care management  Adding amlodipine today  Cardiologist consulted  As needed IV medications are available for persistent hypertension, required 1 dose overnight  Covid testing negative  Excessively weaned off of oxygen  Resuming other home medications as indicated  PT and OT evaluation, per  evaluation home with home health, likely tomorrow     Discussed plan with RN, , cardiologist    DVT prophylaxis:  Medical DVT prophylaxis orders are present.    CODE STATUS:   Code Status (Patient has no pulse and is not breathing): CPR (Attempt to Resuscitate)  Medical Interventions (Patient has pulse or is breathing): Full Support    Electronically signed by Stefan Newsome MD, 02/09/22, 11:21 AM EST.    CBC    CBC 2/8/22 2/9/22 2/10/22   WBC 10.41 7.98 8.07   RBC 4.20 3.58 (A) 3.74 (A)   Hemoglobin 12.6 10.6 (A) 11.3 (A)   Hematocrit 38.6 32.8 (A) 33.8 (A)   MCV 91.9 91.6 90.4   MCH 30.0 29.6 30.2   MCHC 32.6 32.3 33.4   RDW 13.5 13.3 13.3   Platelets 178 148 173   (A) Abnormal value              CMP    CMP 2/8/22 2/9/22 2/10/22   Glucose 145 (A) 125 (A) 110 (A)   BUN 18 24 (A) 19   Creatinine 0.94 1.01 (A) 1.00   eGFR Non African Am 60 (A) 55 (A) 55 (A)   Sodium 141 138 139   Potassium 4.1 3.9 4.1   Chloride 101 102 104   Calcium 9.6 9.1 9.5   Albumin 4.40     Total Bilirubin 0.5     Alkaline Phosphatase 83     AST (SGOT) 22     ALT (SGPT) 18     (A) Abnormal value       Comments are available for some flowsheets but are not being displayed.

## 2022-02-10 NOTE — PLAN OF CARE
Goal Outcome Evaluation:  Plan of Care Reviewed With: patient        Progress: improving  Patient was anxious throughout the night, sleeping very minimally. She is very determined to speak with cardiology, note left for physician.     Zanaflex given for leg cramping and discomfort, along with Tylenol, intervention ineffective per patient. Lopressor 5mg IV given x1 for SBP greater than 180.     No further complaints this shift.

## 2022-02-10 NOTE — CONSULTS
[facility]   Cardiology Consult Note    Patient Name: Betty Sprague  : 1955  MRN: [id number]  Primary Care Physician:  Maico Young MD  Referring Physician: Alma Rudd MD  Date of admission: 2022    Subjective   Subjective     Reason for Consult/ Chief Complaint: Uncontrolled hypertension    HPI:  Betty Sprague is a 66 y.o. female with history of coronary s/p PTCA/stent, peripheral vascular disease, multiple other problems including buttock and hip problems.  Admitted with uncontrolled hypertension.  No chest pain.    Review of Systems:   Constitutional no fever,  no weight loss   Skin no rash   Otolaryngeal no difficulty swallowing   Cardiovascular See HPI   Pulmonary no cough, no sputum production   Gastrointestinal no constipation, no diarrhea   Genitourinary no dysuria, no hematuria   Hematologic no easy bruisability, no abnormal bleeding   Musculoskeletal no muscle pain   Neurologic no dizziness, no falls         Personal History       Past Medical/Surgical History:   Past Medical History:   Diagnosis Date   • Arthritis    • Benign essential hypertension    • Cervicalgia 2018   • CHF (congestive heart failure) ([facility])    • COPD (chronic obstructive pulmonary disease) ([facility])    • DDD (degenerative disc disease), lumbar    • Dental decay 2017   • Diabetes ([facility])    • Essential hypertension 2017   • Heart attack ([facility])    • Hip fracture ([facility])    • HLD (hyperlipidemia) 2017    will update her lipids   • HTN (hypertension)    • Hyperlipemia    • Leg pain    • Leg swelling    • Leg weakness, bilateral 2018   • Lumbago 2018    with low back pain   • Lumbar back pain    • Lumbar herniated disc    • Mild episode of recurrent major depressive disorder (HCC) 2017   • Muscle cramps    • Seasonal allergies    • Spinal stenosis of lumbar region 2017   • Spinal stenosis, lumbosacral region 2018   • Tobacco abuse 2017   • Tobacco abuse  counseling 2017   • Vascular disease, peripheral (HCC)      Past Surgical History:   Procedure Laterality Date   • CARDIAC SURGERY     •  SECTION     • CORONARY ANGIOPLASTY WITH STENT PLACEMENT     • TONSILLECTOMY           Family History: Family History of CAD positive.    Social History:  reports that she has been smoking cigarettes. She has been smoking about 0.50 packs per day. She has never used smokeless tobacco. She reports that she does not drink alcohol and does not use drugs.    Medications:  Medications Prior to Admission   Medication Sig Dispense Refill Last Dose   • ALPRAZolam (XANAX) 1 MG tablet Take 1 mg by mouth 4 (Four) Times a Day As Needed.   2022 at Unknown time   • aspirin 81 MG chewable tablet Chew 81 mg Daily. Chew and swallow.   2022 at Unknown time   • atorvastatin (LIPITOR) 40 MG tablet Take 40 mg by mouth Daily.   2022 at Unknown time   • Brilinta 90 MG tablet tablet Take 90 mg by mouth 2 (Two) Times a Day.   2022 at Unknown time   • carvedilol (COREG) 25 MG tablet Take 1 tablet by mouth 2 (Two) Times a Day With Meals. 60 tablet 0 2022 at Unknown time   • citalopram (CeleXA) 10 MG tablet Take 10 mg by mouth Daily.   2022 at Unknown time   • ezetimibe (ZETIA) 10 MG tablet TAKE 1 TABLET BY MOUTH EVERY DAY 30 tablet 8 2022 at Unknown time   • isosorbide mononitrate (IMDUR) 30 MG 24 hr tablet Take 30 mg by mouth Daily.   2022 at Unknown time   • Levemir FlexTouch 100 UNIT/ML injection Inject 25 Units under the skin into the appropriate area as directed 2 (Two) Times a Day.   2022 at Unknown time   • levocetirizine (XYZAL) 5 MG tablet Take 5 mg by mouth Daily.   2022 at Unknown time   • losartan (COZAAR) 100 MG tablet Take 100 mg by mouth Daily.   2022 at Unknown time   • multivitamin with minerals tablet tablet Take 1 tablet by mouth Daily.   2022 at Unknown time   • venlafaxine XR (EFFEXOR-XR) 150 MG 24 hr capsule Take 150 mg by  mouth Daily.   2/7/2022 at Unknown time   • Ozempic, 0.25 or 0.5 MG/DOSE, 2 MG/1.5ML solution pen-injector Inject 0.5 mg under the skin into the appropriate area as directed 1 (One) Time Per Week.      • pioglitazone (ACTOS) 30 MG tablet Take 30 mg by mouth Daily.      • ProAir  (90 Base) MCG/ACT inhaler Inhale 2 puffs Every 4 (Four) Hours As Needed.   Unknown at Unknown time   • TiZANidine (ZANAFLEX) 4 MG capsule Take 4 mg by mouth 4 (Four) Times a Day As Needed.   Unknown at Unknown time     Current medications:  amLODIPine, 10 mg, Oral, Q24H  aspirin, 81 mg, Oral, Daily  atorvastatin, 40 mg, Oral, Daily  carvedilol, 25 mg, Oral, BID With Meals  enoxaparin, 40 mg, Subcutaneous, Q24H  insulin detemir, 20 Units, Subcutaneous, Daily  insulin lispro, 0-7 Units, Subcutaneous, TID AC  isosorbide mononitrate, 30 mg, Oral, Daily  losartan, 100 mg, Oral, Daily  nicotine, 1 patch, Transdermal, Q24H  sodium chloride, 10 mL, Intravenous, Q12H  ticagrelor, 90 mg, Oral, BID  venlafaxine XR, 150 mg, Oral, Daily      Current IV drips:  Pharmacy to Dose enoxaparin (LOVENOX),         Allergies:  Allergies   Allergen Reactions   • Latex Hives       Objective    Objective     Vitals:   Temp:  [97.3 °F (36.3 °C)-98.9 °F (37.2 °C)] 98.9 °F (37.2 °C)  Heart Rate:  [67-75] 75  Resp:  [16-18] 18  BP: (134-189)/() 160/108  Flow (L/min):  [1] 1      Physical Exam:   Constitutional: Awake, alert, No acute distress    Eyes: PERRLA, sclerae anicteric, no conjunctival injection   HENT: NCAT, mucous membranes moist   Neck: Supple, no thyromegaly, no lymphadenopathy, trachea midline   Respiratory: Clear to auscultation bilaterally, nonlabored respirations    Cardiovascular: RRR, no murmurs, rubs, or gallops, palpable pedal pulses bilaterally   Gastrointestinal: Positive bowel sounds, soft, nontender, nondistended   Musculoskeletal: No bilateral ankle edema, no clubbing or cyanosis to extremities   Psychiatric: Appropriate affect,  cooperative   Neurologic: Oriented x 3, strength symmetric in all extremities, Cranial Nerves grossly intact to confrontation, speech clear   Skin: No rashes.    Result Review    Result Review:  I have personally reviewed the results from the time of this admission to 2/10/2022 08:56 EST and agree with these findings:  [x]  Laboratory  [x]  EKG/Telemetry   [x]  Cardiology/Vascular   []  Pathology  [x]  Old records  [x]  Medications  Basic Metabolic Panel    Sodium Sodium   Date Value Ref Range Status   02/10/2022 139 136 - 145 mmol/L Final   02/09/2022 138 136 - 145 mmol/L Final   02/08/2022 141 136 - 145 mmol/L Final      Potassium Potassium   Date Value Ref Range Status   02/10/2022 4.1 3.5 - 5.2 mmol/L Final     Comment:     Slight hemolysis detected by analyzer. Results may be affected.   02/09/2022 3.9 3.5 - 5.2 mmol/L Final   02/08/2022 4.1 3.5 - 5.2 mmol/L Final      Chloride Chloride   Date Value Ref Range Status   02/10/2022 104 98 - 107 mmol/L Final   02/09/2022 102 98 - 107 mmol/L Final   02/08/2022 101 98 - 107 mmol/L Final      Bicarbonate No results found for: PLASMABICARB   BUN BUN   Date Value Ref Range Status   02/10/2022 19 8 - 23 mg/dL Final   02/09/2022 24 (H) 8 - 23 mg/dL Final   02/08/2022 18 8 - 23 mg/dL Final      Creatinine Creatinine   Date Value Ref Range Status   02/10/2022 1.00 0.57 - 1.00 mg/dL Final   02/09/2022 1.01 (H) 0.57 - 1.00 mg/dL Final   02/08/2022 0.94 0.57 - 1.00 mg/dL Final      Calcium Calcium   Date Value Ref Range Status   02/10/2022 9.5 8.6 - 10.5 mg/dL Final   02/09/2022 9.1 8.6 - 10.5 mg/dL Final   02/08/2022 9.6 8.6 - 10.5 mg/dL Final      Glucose      No components found for: GLUCOSE.*        Lab Results   Component Value Date    PROBNP 526.5 02/08/2022          EKG shows sinus rhythm with no acute changes.  Telemetry reviewed    Assessment / Plan     Impression:   1.  Uncontrolled hypertension.  2.  CORONARY ARTERY DISEASE s/p PTCA/stent.  3.  Peripheral vascular  disease s/p PCI.  4.  Hyperlipidemia.  5.  Positive nicotine use    Plan:   1.  Continue losartan 100 mg once a day.  Add amlodipine 10 mg once a day.  Low-salt diet and fluid restriction advised.  2.  Continue aspirin 81 mg a day.  Continue Brilinta 90 mg twice daily.  Continue Imdur.  3.  Continue Lipitor 40 mg a day.  4.  Smoking cessation discussed with patient.    Electronically signed by Andrew Mendoza MD, 02/10/22, 8:56 AM EST.

## 2022-02-10 NOTE — PLAN OF CARE
Goal Outcome Evaluation: Pt improving. Understands plan of care and importance of medication compliance. Norvasc 10mg added to med list. Refusing insulin for correction of hyperglycemia, MD aware.

## 2022-02-11 NOTE — PROGRESS NOTES
[facility]     Cardiology Progress Note    Patient Name: Betty Sprague  : 1955  MRN: [id number]  Primary Care Physician:  Maico Young MD  Date of admission: 2022    Subjective   Subjective   CC: Uncontrolled hypertension    HPI:    Betty Sprague is a 66 y.o. female with history of CORONARY ARTERY DISEASE, peripheral vascular disease admitted with uncontrolled hypertension.  Blood pressure is better controlled now.  No chest pain or shortness of breath    Objective     ROS:  Respiratory: No Cough, No Dyspnea  Cardiovascular: No CP Classic for Angina    Vitals:   Temp:  [96.8 °F (36 °C)-98.8 °F (37.1 °C)] 97.5 °F (36.4 °C)  Heart Rate:  [67-87] 72  Resp:  [16-20] 20  BP: (156-172)/(59-93) 172/87  Physical Exam    Constitutional: Awake, alert, No acute distress   Eyes: PERRLA, sclerae anicteric, no conjunctival injection   HENT: NCAT, mucous membranes moist   Neck: Supple, no thyromegaly, no lymphadenopathy, trachea midline   Respiratory: Clear to auscultation bilaterally, nonlabored respirations    Cardiovascular: RRR, no murmurs, rubs, or gallops, palpable pedal pulses bilaterally   Musculoskeletal: No bilateral ankle edema, no clubbing or cyanosis to extremities   Neurologic: Oriented x 3, strength symmetric in all extremities, speech clear  Current medications:  amLODIPine, 10 mg, Oral, Q24H  aspirin, 81 mg, Oral, Daily  atorvastatin, 40 mg, Oral, Daily  carvedilol, 25 mg, Oral, BID With Meals  enoxaparin, 40 mg, Subcutaneous, Q24H  insulin detemir, 20 Units, Subcutaneous, Daily  insulin lispro, 0-7 Units, Subcutaneous, TID AC  isosorbide mononitrate, 30 mg, Oral, Daily  losartan, 100 mg, Oral, Daily  nicotine, 1 patch, Transdermal, Q24H  sodium chloride, 10 mL, Intravenous, Q12H  ticagrelor, 90 mg, Oral, BID  venlafaxine XR, 150 mg, Oral, Daily      Current IV drips:  Pharmacy to Dose enoxaparin (LOVENOX),          Result Review       Result Review:  I have personally reviewed the results  from the time of this admission to 2/11/2022 08:41 EST and agree with these findings:  [x]  Laboratory  []  Microbiology  [x]  Radiology  [x]  EKG  [x]  Cardiology/Vascular   CBC    CBC 2/9/22 2/10/22 2/11/22   WBC 7.98 8.07 7.37   RBC 3.58 (A) 3.74 (A) 3.77   Hemoglobin 10.6 (A) 11.3 (A) 11.2 (A)   Hematocrit 32.8 (A) 33.8 (A) 35.4   MCV 91.6 90.4 93.9   MCH 29.6 30.2 29.7   MCHC 32.3 33.4 31.6   RDW 13.3 13.3 13.3   Platelets 148 173 174   (A) Abnormal value            CMP    CMP 2/8/22 2/9/22 2/10/22   Glucose 145 (A) 125 (A) 110 (A)   BUN 18 24 (A) 19   Creatinine 0.94 1.01 (A) 1.00   eGFR Non African Am 60 (A) 55 (A) 55 (A)   Sodium 141 138 139   Potassium 4.1 3.9 4.1   Chloride 101 102 104   Calcium 9.6 9.1 9.5   Albumin 4.40     Total Bilirubin 0.5     Alkaline Phosphatase 83     AST (SGOT) 22     ALT (SGPT) 18     (A) Abnormal value       Comments are available for some flowsheets but are not being displayed.                      Telemetry reviewed  Cardiac Medications Reviewed.    Assessment/Plan   Assessment / Plan   1.  Uncontrolled hypertension.  2.  CORONARY ARTERY DISEASE s/p PTCA/stent.  3.  Peripheral vascular disease s/p PCI.  4.  Hyperlipidemia.  5.  Positive nicotine use     Plan:   1.  Continue losartan 100 mg once a day.    Continue amlodipine 10 mg once a day.  Low-salt diet and fluid restriction advised.  Labile blood pressure may be related to pain and anxiety.  2.  Continue aspirin 81 mg a day.  Continue Brilinta 90 mg twice daily.  Continue Imdur.  3.  Continue Lipitor 40 mg a day.  4.  Smoking cessation discussed with patient.  5.  Follow-up in office as an outpatient.      Electronically signed by Andrew Mendoza MD, 02/11/22, 8:41 AM EST.

## 2022-02-11 NOTE — DISCHARGE SUMMARY
[facility]         HOSPITALIST  DISCHARGE SUMMARY    Patient Name: Betty Sprague  : 1955  MRN: [id number]    Date of Admission: 2022  Date of Discharge:  2022  Primary Care Physician: Maico Young MD    Consults     Date and Time Order Name Status Description    2/10/2022  7:14 AM Inpatient Cardiology Consult            Active and Resolved Hospital Problems:  Hypertensive emergency, hypoxia secondary to hypertension  Acute hypoxic respiratory failure, requiring 2 L nasal cannula, dyspneic on arrival  History of hypertension  Weakness  COPD not exacerbation  Heart failure with reduced ejection fraction, not in exacerbation  Hyperlipidemia  Diabetes    Hospital Course     Hospital Course:  Betty Sprague is a 66 y.o. female with a past medical history significant for CHF, COPD, diabetes, hypertension, hyperlipidemia, and recent admission for hypertensive urgency.  Patient presents emergency department just 2 days after her last discharge with complaints of not feeling well at home.  Patient endorses weakness and shortness of breath.  In the emergency department patient requiring 2 L to maintain saturations greater than 90%, complaining of dyspnea.  Patient found to be significantly hypertensive with a systolic blood pressure of 244 on arrival.  In the emergency department patient received clonidine, hydralazine, labetalol with improvement in blood pressure.  Patient required additional doses of IV medications to maintain blood pressures at a safe level.  Patient required cardiology consult as well as addition of new medications to achieve blood pressure control.  Patient was evaluated by PT and OT, recommending home with home health.  Patient seen on date of discharge, clinically and hemodynamically stable.  Patient provided concerning signs and symptoms prompting immediate medical attention, patient understanding and agreeable.  All of patient's medications were sent to  pharmacy as new refills.  Patient will leave with all cardiac medications in hand.     DISCHARGE Follow Up Recommendations for labs and diagnostics:   Follow-up with PCP  Follow-up with cardiology      Day of Discharge     Vital Signs:  Temp:  [96.8 °F (36 °C)-98.8 °F (37.1 °C)] 97.5 °F (36.4 °C)  Heart Rate:  [67-87] 72  Resp:  [16-20] 20  BP: (156-172)/(59-93) 172/87  Physical Exam:                 Constitutional: Awake, alert, resting comfortably in bed, breathing comfortably on room air              Eyes: Pupils equal, sclerae anicteric, no conjunctival injection              HENT: NCAT, mucous membranes moist              Neck: Supple, no thyromegaly, no lymphadenopathy, trachea midline              Respiratory: Clear to auscultation bilaterally, no crackles no wheezing              Cardiovascular: RRR, no murmurs, rubs, or gallops, palpable pedal pulses bilaterally              Gastrointestinal: Positive bowel sounds, soft, nontender, nondistended              Musculoskeletal: No bilateral ankle edema, no clubbing or cyanosis to extremities              Psychiatric: Appropriate affect, cooperative              Neurologic: Oriented x 3, strength symmetric in all extremities, Cranial Nerves grossly intact to confrontation, speech clear              Skin: No rashes       Discharge Details        Discharge Medications      New Medications      Instructions Start Date   amLODIPine 10 MG tablet  Commonly known as: NORVASC   10 mg, Oral, Every 24 Hours Scheduled   Start Date: February 12, 2022        Continue These Medications      Instructions Start Date   ALPRAZolam 1 MG tablet  Commonly known as: XANAX   1 mg, Oral, 4 Times Daily PRN      aspirin 81 MG chewable tablet   81 mg, Oral, Daily, Chew and swallow.       atorvastatin 40 MG tablet  Commonly known as: LIPITOR   40 mg, Oral, Daily      Brilinta 90 MG tablet tablet  Generic drug: ticagrelor   90 mg, Oral, 2 Times Daily      carvedilol 25 MG tablet  Commonly  known as: COREG   25 mg, Oral, 2 Times Daily With Meals      citalopram 10 MG tablet  Commonly known as: CeleXA   10 mg, Oral, Daily      ezetimibe 10 MG tablet  Commonly known as: ZETIA   TAKE 1 TABLET BY MOUTH EVERY DAY      isosorbide mononitrate 30 MG 24 hr tablet  Commonly known as: IMDUR   30 mg, Oral, Daily      Levemir FlexTouch 100 UNIT/ML injection  Generic drug: insulin detemir   25 Units, Subcutaneous, 2 Times Daily      levocetirizine 5 MG tablet  Commonly known as: XYZAL   5 mg, Oral, Daily      losartan 100 MG tablet  Commonly known as: COZAAR   100 mg, Oral, Daily      multivitamin with minerals tablet tablet   1 tablet, Oral, Daily      Ozempic (0.25 or 0.5 MG/DOSE) 2 MG/1.5ML solution pen-injector  Generic drug: Semaglutide(0.25 or 0.5MG/DOS)   0.5 mg, Subcutaneous, Weekly      pioglitazone 30 MG tablet  Commonly known as: ACTOS   30 mg, Oral, Daily      ProAir  (90 Base) MCG/ACT inhaler  Generic drug: albuterol sulfate HFA   2 puffs, Inhalation, Every 4 Hours PRN      TiZANidine 4 MG capsule  Commonly known as: ZANAFLEX   4 mg, Oral, 4 Times Daily PRN      venlafaxine  MG 24 hr capsule  Commonly known as: EFFEXOR-XR   150 mg, Oral, Daily             Allergies   Allergen Reactions   • Latex Hives       Discharge Disposition:  Home-Health Care [facility]    Diet:  Hospital:  Diet Order   Procedures   • Diet Regular; Consistent Carbohydrate       Discharge Activity:   Activity Instructions     Activity as Tolerated            CODE STATUS:  Code Status and Medical Interventions:   Ordered at: 02/08/22 1018     Code Status (Patient has no pulse and is not breathing):    CPR (Attempt to Resuscitate)     Medical Interventions (Patient has pulse or is breathing):    Full Support         Future Appointments   Date Time Provider Department Center   2/15/2022 10:30 AM Andrew Mendoza MD [facility]       Additional Instructions for the Follow-ups that You Need to Schedule     Discharge  Follow-up with PCP   As directed       Currently Documented PCP:    Maico Young MD    PCP Phone Number:    [phone]     Follow Up Details: In less than one week         Discharge Follow-up with Specified Provider: Dr Mendoza; 2 Weeks   As directed      To: Dr Mendoza    Follow Up: 2 Weeks               Pertinent  and/or Most Recent Results     PROCEDURES:       LAB RESULTS:      Lab 02/11/22  0452 02/10/22  0436 02/09/22  0450 02/08/22  0657 02/06/22 0417 02/05/22 0431 02/05/22 0431 02/04/22 2146 02/04/22 2146   WBC 7.37 8.07 7.98 10.41 7.09   < > 9.19   < > 8.22   HEMOGLOBIN 11.2* 11.3* 10.6* 12.6 10.6*   < > 11.3*   < > 11.6*   HEMATOCRIT 35.4 33.8* 32.8* 38.6 32.0*   < > 34.1   < > 35.1   PLATELETS 174 173 148 178 154   < > 188   < > 194   NEUTROS ABS  --   --  5.38 8.43* 4.65  --  6.23  --  6.18   IMMATURE GRANS (ABS)  --   --  0.02 0.05 0.02  --  0.03  --  0.02   LYMPHS ABS  --   --  1.65 1.03 1.64  --  1.96  --  1.22   MONOS ABS  --   --  0.65 0.61 0.56  --  0.70  --  0.55   EOS ABS  --   --  0.24 0.25 0.18  --  0.22  --  0.21   MCV 93.9 90.4 91.6 91.9 91.7   < > 90.9   < > 90.9    < > = values in this interval not displayed.         Lab 02/11/22  0810 02/11/22  0452 02/10/22  0436 02/09/22 0450 02/08/22 0657 02/06/22 0417 02/05/22 0431 02/05/22 0431   SODIUM 138  --  139 138 141 138   < > 143   POTASSIUM 4.0  --  4.1 3.9 4.1 4.0   < > 3.5   CHLORIDE 101  --  104 102 101 102   < > 104   CO2 27.2  --  26.0 26.3 32.1* 26.8   < > 29.5*   ANION GAP 9.8  --  9.0 9.7 7.9 9.2   < > 9.5   BUN 22  --  19 24* 18 22   < > 18   CREATININE 1.01*  --  1.00 1.01* 0.94 1.03*   < > 0.88   GLUCOSE 142*  --  110* 125* 145* 139*   < > 94   CALCIUM 9.7  --  9.5 9.1 9.6 8.8   < > 9.6   MAGNESIUM  --  2.2 2.1 2.3  --  2.3  --  2.2   HEMOGLOBIN A1C  --   --   --   --   --  6.40*  --   --     < > = values in this interval not displayed.         Lab 02/08/22  0657 02/06/22  0417 02/04/22  2146   TOTAL PROTEIN  7.8 6.2 6.8   ALBUMIN 4.40 3.40* 4.00   GLOBULIN 3.4 2.8 2.8   ALT (SGPT) 18 8 11   AST (SGOT) 22 13 15   BILIRUBIN 0.5 0.4 0.5   ALK PHOS 83 62 75         Lab 02/08/22  1326 02/08/22  0657 02/04/22  2146   PROBNP  --  526.5  --    TROPONIN T <0.010 <0.010  <0.010 <0.010                 Brief Urine Lab Results  (Last result in the past 365 days)      Color   Clarity   Blood   Leuk Est   Nitrite   Protein   CREAT   Urine HCG        02/08/22 1100 Yellow   Clear   Trace   Negative   Negative   30 mg/dL (1+)               Microbiology Results (last 10 days)     Procedure Component Value - Date/Time    COVID-19,APTIMA PANTHER(CARISSA),[facility], NP/OP SWAB IN UTM/VTM/[facility] TRANSPORT MEDIA,24 HR TAT - Swab, Nasopharynx [[id number]]  (Normal) Collected: 02/08/22 1101    Lab Status: Final result Specimen: Swab from Nasopharynx Updated: 02/08/22 1654     COVID19 Not Detected    Narrative:      Fact sheet for providers: https://www.fda.gov/media/368632/download     Fact sheet for patients: https://www.fda.gov/media/682939/download    Test performed by RT PCR.          XR Chest 1 View    Result Date: 2/8/2022  Impression:   1. No acute infiltrate       Adams North M.D.       Electronically Signed and Approved By: Adams North M.D. on 2/08/2022 at 8:42                           Labs Pending at Discharge:        Time spent on Discharge including face to face service:  35 minutes    Electronically signed by Stefan Newsome MD, 02/11/22, 10:48 AM EST.

## 2022-02-11 NOTE — PLAN OF CARE
Goal Outcome Evaluation:      Pt rested well with no complaints.  Administered pain medication for hip pain.  CELSA Lopez RN

## 2022-02-12 NOTE — OUTREACH NOTE
Prep Survey      Responses   [facility] discharged from? [facility]   Is LACE score < 7 ? No   Emergency Room discharge w/ pulse ox? No   Eligibility Readm Mgmt   Discharge diagnosis Hypertensive emergency, hypoxia Acute hypoxic respiratory failure   Does the patient have one of the following disease processes/diagnoses(primary or secondary)? Other   Does the patient have Home health ordered? No   Is there a DME ordered? No   Prep survey completed? Yes          Isabell Hamilton RN

## 2022-02-15 NOTE — OUTREACH NOTE
Medical Week 1 Survey      Responses   [facility] patient discharged from? [facility]   Does the patient have one of the following disease processes/diagnoses(primary or secondary)? Other   Week 1 attempt successful? Yes   Call start time 1815   Call end time 1818   General alerts for this patient CHF, COPD, type 2 diabetes, essential hypertension, hyperlipidemia   Discharge diagnosis Hypertensive emergency, hypoxia Acute hypoxic respiratory failure   Is patient permission given to speak with other caregiver? No   Meds reviewed with patient/caregiver? Yes   Is the patient having any side effects they believe may be caused by any medication additions or changes? No   Does the patient have all medications ordered at discharge? Yes   Is the patient taking all medications as directed (includes completed medication regime)? Yes   Does the patient have a primary care provider?  Yes   Comments regarding PCP Will call tomorrow for Appointment with PCP   Has home health visited the patient within 72 hours of discharge? N/A   Did the patient receive a copy of their discharge instructions? Yes   Nursing interventions Reviewed instructions with patient   What is the patient's perception of their health status since discharge? Improving   Is the patient/caregiver able to teach back signs and symptoms related to disease process for when to call PCP? Yes   Is the patient/caregiver able to teach back signs and symptoms related to disease process for when to call 911? Yes   Is the patient/caregiver able to teach back the hierarchy of who to call/visit for symptoms/problems? PCP, Specialist, Home health nurse, Urgent Care, ED, 911 Yes   Additional teach back comments States she has pretty much quit smoking.    Week 1 call completed? Yes   Wrap up additional comments States she is Improved. very appreciative of care she received.           Estrellita Mitchell RN

## 2022-02-23 NOTE — OUTREACH NOTE
Medical Week 2 Survey      Responses   [facility] patient discharged from? [facility]   Does the patient have one of the following disease processes/diagnoses(primary or secondary)? Other   Week 2 attempt successful? No   Unsuccessful attempts Attempt 1          Mini Griffith LPN

## 2022-02-25 NOTE — OUTREACH NOTE
Medical Week 2 Survey      Responses   [facility] patient discharged from? [facility]   Does the patient have one of the following disease processes/diagnoses(primary or secondary)? Other   Week 2 attempt successful? No   Unsuccessful attempts Attempt 2   Is patient permission given to speak with other caregiver? No          Fawn Mendoza RN

## 2022-03-07 NOTE — OUTREACH NOTE
Medical Week 3 Survey    Flowsheet Row Responses   [facility] patient discharged from? [facility]   Does the patient have one of the following disease processes/diagnoses(primary or secondary)? Other   Week 3 attempt successful? Yes   Call start time 1259   Call end time 1302   General alerts for this patient CHF, COPD, type 2 diabetes, essential hypertension, hyperlipidemia   Discharge diagnosis Hypertensive emergency, hypoxia Acute hypoxic respiratory failure   Meds reviewed with patient/caregiver? Yes   Is the patient taking all medications as directed (includes completed medication regime)? Yes   Medication comments taking all meds   Has the patient kept scheduled appointments due by today? N/A   Comments Sees Cards tomorrow   Psychosocial issues? No   What is the patient's perception of their health status since discharge? Same   If the patient is a current smoker, are they able to teach back resources for cessation? Smoking cessation medications  [Still smoking some but is trying to cut back. ]   Week 3 Call Completed? Yes   Wrap up additional comments B/P still runs a little high at times but she will be discussing with cards tomorrow at her appt.           QUINCY MERIDA - Registered Nurse

## 2022-03-14 NOTE — TELEPHONE ENCOUNTER
Received VM from patient.     SW patient. Patient states she going to have to have hip surgery and wanted Dr Mendoza to know.

## 2022-03-16 NOTE — OUTREACH NOTE
Medical Week 4 Survey    Flowsheet Row Responses   [facility] patient discharged from? [facility]   Does the patient have one of the following disease processes/diagnoses(primary or secondary)? Other   Week 4 attempt successful? Yes   Call start time 0921   Call end time 0922   Discharge diagnosis Hypertensive emergency, hypoxia Acute hypoxic respiratory failure   Person spoke with today (if not patient) and relationship Domonique-daughter (not speaking to patient)          GANGA PINO - Registered Nurse

## 2022-03-24 NOTE — PROGRESS NOTES
"Chief Complaint  Follow-up of the Left Hip     Subjective      Betty Sprague presents to [facility] for a follow-up of left hip. She has a history of a right total hip replacement performed several years ago. She states she is now ready for a left total hip arthroplasty. She has a history of left hip osteoarthritis that she treats conservatively. She has difficulty with weight bearing and ambulation.     Allergies   Allergen Reactions   • Latex Hives        Social History     Socioeconomic History   • Marital status:    Tobacco Use   • Smoking status: Current Every Day Smoker     Packs/day: 0.50     Types: Cigarettes   • Smokeless tobacco: Never Used   Vaping Use   • Vaping Use: Never used   Substance and Sexual Activity   • Alcohol use: Never   • Drug use: Never        Review of Systems     Objective   Vital Signs:   Ht 162.6 cm (64\")   Wt 99.8 kg (220 lb)   BMI 37.76 kg/m²       Physical Exam  Constitutional:       Appearance: Normal appearance. Patient is well-developed and normal weight.   HENT:      Head: Normocephalic.      Right Ear: Hearing and external ear normal.      Left Ear: Hearing and external ear normal.      Nose: Nose normal.   Eyes:      Conjunctiva/sclera: Conjunctivae normal.   Cardiovascular:      Rate and Rhythm: Normal rate.   Pulmonary:      Effort: Pulmonary effort is normal.      Breath sounds: No wheezing or rales.   Abdominal:      Palpations: Abdomen is soft.      Tenderness: There is no abdominal tenderness.   Musculoskeletal:      Cervical back: Normal range of motion.   Skin:     Findings: No rash.   Neurological:      Mental Status: Patient is alert and oriented to person, place, and time.   Psychiatric:         Mood and Affect: Mood and affect normal.         Judgment: Judgment normal.       Ortho Exam      LEFT HIP: Tender hip and pelvic muscles. Groin pain with hip range of motion. Decreased hip range of motion. Good tone of hip flexors, " hip extensors, hip adductor, hip abductors. Calf supple, non-tender, no signs of DVT. Dorsal Pedal Pulse 2+, posterior tibialis pulse 2+. Limping gait.       Procedures      Imaging Results (Most Recent)     Procedure Component Value Units Date/Time    XR Hip With or Without Pelvis 2 - 3 View Left [[id number]] Resulted: 03/24/22 1040     Updated: 03/24/22 1049           Result Review :     X-Ray Report:  Left hip(s) X-Ray  Indication: Evaluation of left hip pain   AP and Lateral view(s)  Findings: Advanced degenerative changes of the left hip. No acute fracture.   Prior studies available for comparison: no     Assessment and Plan     DX: Left hip osteoarthritis     Discussed treatment plans with the patient. Discussed left total hip arthroplasty, she is a candidate for this. She understands and wishes to proceed. Will obtain clearance from her cardiologist for surgery. Patient reports having a history of a blood clots.     Discussed surgery., Risks/benefits discussed with patient including, but not limited to: infection, bleeding, neurovascular damage, malunion, nonunion, aesthetic deformity, need for further surgery, and death., Discussed with patient the implant type being used during surgery and patient understands and desires to proceed. and Surgery pamphlet given.    Follow Up     Post-operatively.       Patient was given instructions and counseling regarding her condition or for health maintenance advice. Please see specific information pulled into the AVS if appropriate.     Scribed for Amanda Figueroa MD by Jaylin Obrien.  03/24/22   10:52 EDT    I have personally performed the services described in this document as scribed by the above individual and it is both accurate and complete. Amanda Figueroa MD 03/25/22

## 2022-03-31 NOTE — TELEPHONE ENCOUNTER
Received VM from patient.    Returned call. Patient stated that B/L calves were swollen and red. Advised she needed to proceed to the ER to be evaluated.

## 2022-04-14 NOTE — PROGRESS NOTES
[facility]  Cardiology progress Note    Patient Name: Betty Sprague  : 1955    CHIEF COMPLAINT  CAD s/p PTCA/stent.      Subjective   Subjective     HISTORY OF PRESENT ILLNESS    Betty Sprague is a 66 y.o. female with CORONARY ARTERY DISEASE s/p PTCA/stent.  No chest pain or shortness of breath.  Review of Systems:   Constitutional no fever,  no weight loss   Skin no rash   Otolaryngeal no difficulty swallowing   Cardiovascular See HPI   Pulmonary no cough, no sputum production   Gastrointestinal no constipation, no diarrhea   Genitourinary no dysuria, no hematuria   Hematologic no easy bruisability, no abnormal bleeding   Musculoskeletal no muscle pain   Neurologic no dizziness, no falls         Personal History     Social History:  reports that she has been smoking cigarettes. She has been smoking about 0.50 packs per day. She has never used smokeless tobacco. She reports that she does not drink alcohol and does not use drugs.    Home Medications:  Current Outpatient Medications on File Prior to Visit   Medication Sig   • ALPRAZolam (XANAX) 1 MG tablet Take 1 mg by mouth 4 (Four) Times a Day As Needed.   • atorvastatin (LIPITOR) 40 MG tablet Take 40 mg by mouth Daily.   • citalopram (CeleXA) 10 MG tablet Take 10 mg by mouth Daily.   • ezetimibe (ZETIA) 10 MG tablet TAKE 1 TABLET BY MOUTH EVERY DAY   • furosemide (LASIX) 40 MG tablet Take 1 tablet by mouth Daily.   • Levemir FlexTouch 100 UNIT/ML injection Inject 25 Units under the skin into the appropriate area as directed 2 (Two) Times a Day.   • levocetirizine (XYZAL) 5 MG tablet Take 5 mg by mouth Daily.   • multivitamin with minerals tablet tablet Take 1 tablet by mouth Daily.   • Ozempic, 0.25 or 0.5 MG/DOSE, 2 MG/1.5ML solution pen-injector Inject 0.5 mg under the skin into the appropriate area as directed 1 (One) Time Per Week.   • pioglitazone (ACTOS) 30 MG tablet Take 30 mg by mouth Daily.   • potassium chloride (MICRO-K) 10 MEQ CR  capsule Take 10 mEq by mouth Daily.   • ProAir  (90 Base) MCG/ACT inhaler Inhale 2 puffs Every 4 (Four) Hours As Needed.   • TiZANidine (ZANAFLEX) 4 MG capsule Take 4 mg by mouth 4 (Four) Times a Day As Needed.   • venlafaxine XR (EFFEXOR-XR) 150 MG 24 hr capsule Take 150 mg by mouth Daily.   • carvedilol (COREG) 25 MG tablet Take 1 tablet by mouth 2 (Two) Times a Day With Meals for 30 days.   • isosorbide mononitrate (IMDUR) 30 MG 24 hr tablet Take 1 tablet by mouth Daily for 30 days.   • losartan (COZAAR) 100 MG tablet Take 1 tablet by mouth Daily for 30 days.     No current facility-administered medications on file prior to visit.     Allergies:  Allergies   Allergen Reactions   • Latex Hives       Objective    Objective       Vitals:   Heart Rate:  [60] 60  BP: (114)/(50) 114/50  Body mass index is 40.34 kg/m².     Physical Exam:   Constitutional: Awake, alert, No acute distress    Eyes: PERRLA, sclerae anicteric, no conjunctival injection   HENT: NCAT, mucous membranes moist   Neck: Supple, no thyromegaly, no lymphadenopathy, trachea midline   Respiratory: Clear to auscultation bilaterally, nonlabored respirations    Cardiovascular: RRR, no murmurs or rubs. Palpable pedal pulses bilaterally   Musculoskeletal: No bilateral ankle edema, no cyanosis to extremities   Psychiatric: Appropriate affect, cooperative   Neurologic: Oriented x 3, strength symmetric in all extremities, Cranial Nerves grossly intact to confrontation, speech clear   Skin: No rashes.    Result Review    Result Review:  I have personally reviewed the available results from  [x]  Laboratory  [x]  EKG  [x]  Cardiology  [x]  Medications  [x]  Old records  []  Other:   Procedures  No results found for: CHOL  Lab Results   Component Value Date    TRIG 166 (H) 02/25/2021    TRIG 182 (H) 09/23/2020    TRIG 271 (H) 06/20/2019     Lab Results   Component Value Date    HDL 40 02/25/2021    HDL 43 09/23/2020    HDL 30 (L) 06/20/2019     Lab Results    Component Value Date     (H) 02/25/2021     (H) 09/23/2020    LDL 79 06/20/2019     Lab Results   Component Value Date    VLDL 33 02/25/2021    VLDL 36 09/23/2020    VLDL 54 (H) 06/20/2019        Impression/Plan:  1.  CORONARY ARTERY DISEASE s/p PTCA/stent stable: Continue Imdur 30 mg a day.  Continue aspirin 81 mg a day.  2.  Essential hypertension controlled: Continue Cozaar 100 mg a day.  3.  Chronic diastolic heart failure stable: Continue Lasix 40 mg a day.  4.  Peripheral vascular disease s/p PTCA/stent stable: Continue aspirin 81 mg a day.  5.  Morbid obesity: Low-fat diet and exercise advised.  6.  Preop for left hip surgery: In view of multiple medical problems and CORONARY ARTERY DISEASE moderately high risk for any surgery discussed this with the patient she understands and wants to proceed.  7.  Positive nicotine use: Smoking cessation discussed the patient again      Andrew Mendoza MD   04/15/22   13:14 EDT

## 2022-04-19 NOTE — TELEPHONE ENCOUNTER
Caller: Betty Sprague    Relationship to patient: Self    Best call back number: [phone]  Type of visit: SURGERY        Additional notes:PATIENT CALLING WANTING TO SCHEDULE HER HIP SURGERY. SHE IS ALSO WANTING BLOOD WORK RESULTS.

## 2022-04-20 PROBLEM — M16.9 OA (OSTEOARTHRITIS) OF HIP: Status: ACTIVE | Noted: 2022-01-01

## 2022-04-20 NOTE — TELEPHONE ENCOUNTER
WAITING ON CASE REQUEST FROM DR. JEFFREY FOR LEFT VIKTOR, IN BASKET MESSAGE WAS SENT TO DR. JEFFREY ON 4/18/2022 @ 2:52PM REQUESTING CASE REQUEST, ONCE CASE REQUEST IS ENTERED I WILL CONTACT PATIENT TO SCHEDULE SURGERY. I ATTEMPTED TO CALL PATIENT ON PHONE #: [phone], NO ANSWER, LV STATING WE ARE WAITING ON DR. JEFFREY TO PUT THE ORDER IN FOR SURGERY AND ONCE HE PUTS THAT ORDER IN I WILL CALL HER TO SCHEDULE SURGERY.

## 2022-05-10 ENCOUNTER — TELEPHONE (OUTPATIENT)
Dept: ORTHOPEDIC SURGERY | Facility: CLINIC | Age: 67
End: 2022-05-10

## 2022-05-10 NOTE — TELEPHONE ENCOUNTER
I HAVE MADE MULTIPLE ATTEMPTS AT TRYING TO REACH PATIENT TO SCHEDULE HER FOR A LEFT TOTAL HIP REPLACEMENT.     4/21/2022 @ 9:05AM - NO ANSWER, LVM     4/21/2022 @ 2:39PM - NO ANSWER, LVM    4/25/2022 @ 8:39AM - NO ANSWER, VOICEMAIL FULL, UNABLE TO LVM     5/10/2022 @ 8:22AM - NO ANSWER, LVM

## 2023-05-02 NOTE — TELEPHONE ENCOUNTER
12/9 no show left voicemail to r/s   Picato Counseling:  I discussed with the patient the risks of Picato including but not limited to erythema, scaling, itching, weeping, crusting, and pain.